# Patient Record
Sex: FEMALE | HISPANIC OR LATINO | Employment: FULL TIME | URBAN - METROPOLITAN AREA
[De-identification: names, ages, dates, MRNs, and addresses within clinical notes are randomized per-mention and may not be internally consistent; named-entity substitution may affect disease eponyms.]

---

## 2023-06-14 ENCOUNTER — LAB REQUISITION (OUTPATIENT)
Dept: LAB | Facility: CLINIC | Age: 18
End: 2023-06-14
Payer: COMMERCIAL

## 2023-06-14 ENCOUNTER — TRANSFERRED RECORDS (OUTPATIENT)
Dept: HEALTH INFORMATION MANAGEMENT | Facility: CLINIC | Age: 18
End: 2023-06-14
Payer: COMMERCIAL

## 2023-06-14 ENCOUNTER — MEDICAL CORRESPONDENCE (OUTPATIENT)
Dept: HEALTH INFORMATION MANAGEMENT | Facility: CLINIC | Age: 18
End: 2023-06-14
Payer: COMMERCIAL

## 2023-06-14 ENCOUNTER — TRANSCRIBE ORDERS (OUTPATIENT)
Dept: MATERNAL FETAL MEDICINE | Facility: CLINIC | Age: 18
End: 2023-06-14
Payer: COMMERCIAL

## 2023-06-14 DIAGNOSIS — Z34.82 ENCOUNTER FOR SUPERVISION OF OTHER NORMAL PREGNANCY, SECOND TRIMESTER: ICD-10-CM

## 2023-06-14 DIAGNOSIS — O26.90 PREGNANCY RELATED CONDITION, ANTEPARTUM: Primary | ICD-10-CM

## 2023-06-14 LAB
ABO/RH(D): NORMAL
ANTIBODY SCREEN: NEGATIVE
BASOPHILS # BLD AUTO: 0 10E3/UL (ref 0–0.2)
BASOPHILS NFR BLD AUTO: 0 %
EOSINOPHIL # BLD AUTO: 0.1 10E3/UL (ref 0–0.7)
EOSINOPHIL NFR BLD AUTO: 1 %
ERYTHROCYTE [DISTWIDTH] IN BLOOD BY AUTOMATED COUNT: 13.2 % (ref 10–15)
HBA1C MFR BLD: 5.1 %
HBV SURFACE AG SERPL QL IA: NONREACTIVE
HCT VFR BLD AUTO: 39.7 % (ref 35–47)
HCV AB SERPL QL IA: NONREACTIVE
HGB BLD-MCNC: 13.3 G/DL (ref 11.7–15.7)
HIV 1+2 AB+HIV1 P24 AG SERPL QL IA: NONREACTIVE
IMM GRANULOCYTES # BLD: 0 10E3/UL
IMM GRANULOCYTES NFR BLD: 0 %
LYMPHOCYTES # BLD AUTO: 2.5 10E3/UL (ref 0.8–5.3)
LYMPHOCYTES NFR BLD AUTO: 33 %
MCH RBC QN AUTO: 31.7 PG (ref 26.5–33)
MCHC RBC AUTO-ENTMCNC: 33.5 G/DL (ref 31.5–36.5)
MCV RBC AUTO: 95 FL (ref 78–100)
MONOCYTES # BLD AUTO: 0.7 10E3/UL (ref 0–1.3)
MONOCYTES NFR BLD AUTO: 9 %
NEUTROPHILS # BLD AUTO: 4.3 10E3/UL (ref 1.6–8.3)
NEUTROPHILS NFR BLD AUTO: 57 %
NRBC # BLD AUTO: 0 10E3/UL
NRBC BLD AUTO-RTO: 0 /100
PLATELET # BLD AUTO: 256 10E3/UL (ref 150–450)
RBC # BLD AUTO: 4.19 10E6/UL (ref 3.8–5.2)
SPECIMEN EXPIRATION DATE: NORMAL
WBC # BLD AUTO: 7.6 10E3/UL (ref 4–11)

## 2023-06-14 PROCEDURE — 87389 HIV-1 AG W/HIV-1&-2 AB AG IA: CPT | Mod: ORL

## 2023-06-14 PROCEDURE — 87340 HEPATITIS B SURFACE AG IA: CPT | Mod: ORL

## 2023-06-14 PROCEDURE — 86762 RUBELLA ANTIBODY: CPT | Mod: ORL

## 2023-06-14 PROCEDURE — 86592 SYPHILIS TEST NON-TREP QUAL: CPT | Mod: ORL

## 2023-06-14 PROCEDURE — 86850 RBC ANTIBODY SCREEN: CPT | Mod: ORL

## 2023-06-14 PROCEDURE — 87491 CHLMYD TRACH DNA AMP PROBE: CPT | Mod: ORL

## 2023-06-14 PROCEDURE — 83036 HEMOGLOBIN GLYCOSYLATED A1C: CPT | Mod: ORL

## 2023-06-14 PROCEDURE — 86803 HEPATITIS C AB TEST: CPT | Mod: ORL

## 2023-06-14 PROCEDURE — 86901 BLOOD TYPING SEROLOGIC RH(D): CPT | Mod: ORL

## 2023-06-14 PROCEDURE — 85025 COMPLETE CBC W/AUTO DIFF WBC: CPT | Mod: ORL

## 2023-06-14 PROCEDURE — 87086 URINE CULTURE/COLONY COUNT: CPT | Mod: ORL

## 2023-06-14 PROCEDURE — 87591 N.GONORRHOEAE DNA AMP PROB: CPT | Mod: ORL

## 2023-06-15 LAB
C TRACH DNA SPEC QL PROBE+SIG AMP: POSITIVE
N GONORRHOEA DNA SPEC QL NAA+PROBE: NEGATIVE
RPR SER QL: NONREACTIVE
RUBV IGG SERPL QL IA: 0.38 INDEX
RUBV IGG SERPL QL IA: NORMAL

## 2023-06-16 LAB — BACTERIA UR CULT: NORMAL

## 2023-07-03 ENCOUNTER — PRE VISIT (OUTPATIENT)
Dept: MATERNAL FETAL MEDICINE | Facility: CLINIC | Age: 18
End: 2023-07-03
Payer: COMMERCIAL

## 2023-07-11 ENCOUNTER — HOSPITAL ENCOUNTER (OUTPATIENT)
Dept: ULTRASOUND IMAGING | Facility: CLINIC | Age: 18
Discharge: HOME | End: 2023-07-11
Payer: COMMERCIAL

## 2023-07-11 ENCOUNTER — OFFICE VISIT (OUTPATIENT)
Dept: MATERNAL FETAL MEDICINE | Facility: CLINIC | Age: 18
End: 2023-07-11
Payer: COMMERCIAL

## 2023-07-11 DIAGNOSIS — O99.210 OBESITY IN PREGNANCY: Primary | ICD-10-CM

## 2023-07-11 DIAGNOSIS — O26.90 PREGNANCY RELATED CONDITION, ANTEPARTUM: ICD-10-CM

## 2023-07-11 DIAGNOSIS — Z36.2 ENCOUNTER FOR FOLLOW-UP ULTRASOUND OF FETAL ANATOMY: ICD-10-CM

## 2023-07-11 PROCEDURE — 76811 OB US DETAILED SNGL FETUS: CPT

## 2023-07-11 PROCEDURE — 76811 OB US DETAILED SNGL FETUS: CPT | Mod: 26 | Performed by: OBSTETRICS & GYNECOLOGY

## 2023-07-11 NOTE — PROGRESS NOTES
"Please see \"Imaging\" tab under \"Chart Review\" for details of today's US at the [facility].    Camden Cook MD  Maternal-Fetal Medicine    "

## 2023-08-02 ENCOUNTER — LAB (OUTPATIENT)
Dept: LAB | Facility: CLINIC | Age: 18
End: 2023-08-02
Attending: OBSTETRICS & GYNECOLOGY
Payer: COMMERCIAL

## 2023-08-02 ENCOUNTER — HOSPITAL ENCOUNTER (OUTPATIENT)
Dept: ULTRASOUND IMAGING | Facility: CLINIC | Age: 18
Discharge: HOME | End: 2023-08-02
Attending: OBSTETRICS & GYNECOLOGY
Payer: COMMERCIAL

## 2023-08-02 ENCOUNTER — OFFICE VISIT (OUTPATIENT)
Dept: MATERNAL FETAL MEDICINE | Facility: CLINIC | Age: 18
End: 2023-08-02
Attending: STUDENT IN AN ORGANIZED HEALTH CARE EDUCATION/TRAINING PROGRAM
Payer: COMMERCIAL

## 2023-08-02 ENCOUNTER — OFFICE VISIT (OUTPATIENT)
Dept: MATERNAL FETAL MEDICINE | Facility: CLINIC | Age: 18
End: 2023-08-02
Attending: OBSTETRICS & GYNECOLOGY
Payer: COMMERCIAL

## 2023-08-02 VITALS
SYSTOLIC BLOOD PRESSURE: 114 MMHG | OXYGEN SATURATION: 99 % | HEART RATE: 75 BPM | DIASTOLIC BLOOD PRESSURE: 73 MMHG | RESPIRATION RATE: 18 BRPM

## 2023-08-02 DIAGNOSIS — Q75.8 CONGENITAL ABSENCE OF NASAL BONE: ICD-10-CM

## 2023-08-02 DIAGNOSIS — Z31.430 ENCOUNTER OF FEMALE FOR TESTING FOR GENETIC DISEASE CARRIER STATUS FOR PROCREATIVE MANAGEMENT: ICD-10-CM

## 2023-08-02 DIAGNOSIS — Z36.9 ENCOUNTER FOR ANTENATAL SCREENING OF MOTHER: ICD-10-CM

## 2023-08-02 DIAGNOSIS — O28.3 ABNORMAL FETAL ULTRASOUND: ICD-10-CM

## 2023-08-02 DIAGNOSIS — Z31.5 ENCOUNTER FOR PROCREATIVE GENETIC COUNSELING AND TESTING: ICD-10-CM

## 2023-08-02 DIAGNOSIS — O99.210 OBESITY IN PREGNANCY: ICD-10-CM

## 2023-08-02 DIAGNOSIS — Z36.2 ENCOUNTER FOR FOLLOW-UP ULTRASOUND OF FETAL ANATOMY: ICD-10-CM

## 2023-08-02 DIAGNOSIS — O36.5990 FETAL GROWTH RESTRICTION ANTEPARTUM: ICD-10-CM

## 2023-08-02 DIAGNOSIS — O26.90 PREGNANCY RELATED CONDITION, ANTEPARTUM: Primary | ICD-10-CM

## 2023-08-02 DIAGNOSIS — O28.3 ABNORMAL FETAL ULTRASOUND: Primary | ICD-10-CM

## 2023-08-02 PROCEDURE — 76820 UMBILICAL ARTERY ECHO: CPT | Mod: 26 | Performed by: STUDENT IN AN ORGANIZED HEALTH CARE EDUCATION/TRAINING PROGRAM

## 2023-08-02 PROCEDURE — 36415 COLL VENOUS BLD VENIPUNCTURE: CPT

## 2023-08-02 PROCEDURE — 96040 HC GENETIC COUNSELING, EACH 30 MINUTES: CPT

## 2023-08-02 PROCEDURE — 76816 OB US FOLLOW-UP PER FETUS: CPT | Mod: 26 | Performed by: STUDENT IN AN ORGANIZED HEALTH CARE EDUCATION/TRAINING PROGRAM

## 2023-08-02 PROCEDURE — 99214 OFFICE O/P EST MOD 30 MIN: CPT | Mod: 25 | Performed by: STUDENT IN AN ORGANIZED HEALTH CARE EDUCATION/TRAINING PROGRAM

## 2023-08-02 PROCEDURE — 76820 UMBILICAL ARTERY ECHO: CPT

## 2023-08-02 NOTE — NURSING NOTE
Patient reports not yet feeling fetal movement, denies pain, denies contractions/pre-term labor, leaking of fluid, or bleeding. Patient denies headache, visual changes, nausea/vomiting, epigastric pain related to preeclampsia. Education provided to patient on RL2. SBAR given to BON SOLOMON, see their note in Epic. BP obtained d/t FGR.    Delia Niño RN

## 2023-08-02 NOTE — PROGRESS NOTES
Please see the full imaging report from the [employer] program under the imaging tab.    La Moore MD  Maternal Fetal Medicine

## 2023-08-03 NOTE — PROGRESS NOTES
[facility]  Genetic Counseling Consult    Patient:  Janet Rhodes YOB: 2005   Date of Service:  8/02/23   MRN: [id number]    Janet was seen at the [facility] for genetic consultation. The indication for genetic counseling is abnormal fetal ultrasound. The patient was accompanied to this visit by their grandmother and aunt.    IMPRESSION/ PLAN   1. Janet has not had genetic screening in this pregnancy but elected to have screening today.     2. During today's [facility] visit, Janet had a blood draw for Expanded NIPS through [facility]. The Expanded NIPS includes screening for trisomy 21, 18, and 13 and 22q11.2 deletion syndrome and the patient opted to screen for sex chromosome aneuploidies, including predicted fetal sex. In accordance with current guidelines, other microdeletion syndromes and rare autosomal trisomies were not included, but reflex to include these conditions remains available with Expanded NIPS if there is an indication later in the pregnancy. Results are expected in 7-14 days. The patient will be called with results and if they do not answer they requested a detailed message with results on their voicemail, including the predicted fetal sex information.  Patient was informed that results, including fetal sex, will be available in [employer]. Janet already knows fetal sex.    3. Janet elected to pursue expanded carrier screening for 558 conditions through [facility] laboratory. Results are expected within 2-3 weeks, and will be available in [employer].  We will contact Janet to discuss the results. She provided verbal permission for results to be left in her voicemail. Authorization to share protected health information was signed today's visit.     4. Janet had a level II comprehensive anatomy ultrasound today. Please see the ultrasound report for further details.    5. Janet is scheduled for follow-up  "ultrasound with [facility] for 2023.    PREGNANCY HISTORY   /Parity:       Janet's pregnancy history is insignificant. This is her first pregnancy.    CURRENT PREGNANCY   Current Age: 18 year old   Age at Delivery: 18 year old  JER: 2023, by Last Menstrual Period                                   Gestational Age: 21w5d  This pregnancy is a single gestation.   Absent nasal bone  We discused that markers identified on a level II ultrasound are findings that are used to adjust risk for chromosome abnormalities.  We discussed the specific finding that was identified today, absent nasal bone. This finding is not expected to cause any complications or concerns on its own, but we discussed that when it is identified, it may increase the risk for a pregnancy to be affected with Down syndrome.  About 50-60% of fetuses with Down syndrome and 6-7% euploid fetuses will have a absent/hypoplastic nasal bone We discussed that the relative risk increase for a absent/hypoplastic nasal bone is 6.6.    Intrauterine Growth Restriction (IUGR)    Intrauterine fetal growth restriction (IUGR) is commonly defined as an estimated fetal weight below the tenth percentile for any gestational age. Pregnancies with IUGR typically have more significant growth restriction than pregnancies with \"small for gestational age\" which can sometimes be constitutionally small, for example, due to parental size and height. Establishing accurate dating is always important before classifying IUGR.    Classifications of IUGR    In 70-80% of IUGR the growth restriction is asymmetrical which means the abdominal circumference is more significantly decreased then the head and femurs. In 20-30% of IUGR the growth restriction is symmetrical and the head, abdomen, and femurs are proportionally decreased. Fetuses with asymmetrical IUGR have been noted to have a higher risk for major anomalies, low birth weight,  mortality, hypertensive " disorders of pregnancy,  delivery, and overall poor outcomes.     Causes of IUGR    Maternal factors: Age, race, socioeconomic status, maternal weight at birth, low pre-pregnancy weight, chronic maternal hypoxia, exposures (smoking), small or large interpregnancy interval, assisted reproductive technologies, maternal diease (ex preeclampsia, chronic hypertension, lupus), malnutrition and gastrointestinal conditions (ex ulcerative colitis), and hematologic disorders    Fetal factors:   5-20% of IUGR cases will be due to a fetal genetic factor  Aneuploidy (ex trisomy 18)  Confined placental mosaicism (CPM) for trisomy 16 (CPM is three times more common in IUGR pregnancies)  Microdeletion syndrome (ex Coffey-Hirschhorn and Cri du chat syndrome)  Ring chromosomes  Uniparental disomy of chromosome 6, 14, 16  Inborn errors of metabolism  Single gene disorders (ex Claudette de Schilling syndrome)  10% of IUGR will also have a congenital anomaly   Single umbilical artery  Anencephaly  Omphalocele/ gastroschisis   Congenital diaphragmatic hernia  Renal agenesis/dysplasoia  Cardiac malformations   In utero infections accounts for less than 5-10% of IUGR cases  Viral infections (ex cytomegalovirus (CMV), herpes)  Parasitic infections (ex toxoplasmosis  Multiple gestation pregnancies like twins and triplets have a higher chance of IUGR     Placental Factors: Insufficiency, low placental weight, placenta acreeta, placental abruption, confined placental mosaicism, partial molar pregnancy, single umbilical artery, velamentous cord insertion, abnormal first trimester screening biochemical markers     Evaluation and Recommendations     Additional ultrasounds and monitoring throughout the pregnancy are common when IUGR is detected. Commonly growth ultrasounds are performed every few weeks as well as studies of the blood flow through the umbilical artery.     Genetic amniocentesis is recommended to consider. Typically a chromosome  "microarray analysis is ordered to look for common genetic causes like aneuploidy and microdeletion syndromes. If other ultrasound findings or family history suggest the likelihood of other genetic conditions then a panel or single gene test may be recommended. Infection studies, such as CMV, is commonly ordered on the amniotic fluid as well. Depending on the gestational age, maternal serology may also be collected to aid in the interpretation of the amniotic fluid infection results.     Non-invasive methods are also available and would likely include cell-free DNA aneuploidy screening for common trisomies and maternal serology studies.     Evaluation and referral to a pediatric geneticist would be recommended if there are any other concerns such as abnormalities, dysmorphic features, developmental delays, or growth problems after delivery.     Janet elected to proceed with NIPT and carrier screening.  MEDICAL HISTORY   Janet s reported medical history is not expected to impact pregnancy management or risks to fetal development.       FAMILY HISTORY   A three-generation pedigree was obtained today and is scanned under the \"Media\" tab in Epic. The family history was reported by Janet and their grandmother and aunt.    The following significant findings were reported today:   Janet's brother (14y) has syndactyly. Syndactyly is a condition where children are born with fused or webbed fingers/toes. It affects 1 in 2,500 babies. Sometimes it can be associated with a syndrome. Otherwise, when isolated it is typically multifactorial, or due to multiple factors. In Janet's brother's case it appears to be isolated.  Janet's father was killed at 28-years-old.  Janet's partner (25y) is healthy. Her partner's mother  in a car accident.    Otherwise, the reported family history is unremarkable for multiple miscarriages, stillbirths, birth defects, intellectual disabilities, known genetic conditions, cancer <50y, " and consanguinity.       CARRIER SCREENING   Expanded carrier screening is available to screen for autosomal recessive conditions and X-linked conditions in a large list of genes. Autosomal recessive conditions happen when a mutation has been inherited from the egg and sperm and include conditions like cystic fibrosis, thalassemia, hearing loss, spinal muscular atrophy, and more. X-linked conditions happen when a mutation has been inherited from the egg and include conditions like fragile X syndrome.  screening was also reviewed. About [place] Scre[facility]    As part of our conversation on carrier screening and how common or rare these condition are, we discussed the patient is of [place] ancestry and the father of the baby is of  ancestry.    The patient elected to pursue carrier screening today. We discussed the following:   Carrier screening does not test the pregnancy but gives a risk assessment for the pregnancy and future pregnancies to have the condition  There are different size panels or list of conditions for carrier screening. The patient elected for [facility]'s comprehensive panel of 558 genes including fragile X syndrome  Some conditions cause health problems for carriers  Carrier screening does not test for all genetic and health conditions or risk factors  There are limitations to current technology and results may be updated at a later date  The results typically take 2-3 weeks. They will be available in EPIC and routed to the referring OB provider. The patient can view them in [employer] and the lab's patient portal.  The patient's partner will be recommended to have carrier screening if the patient is found to be a carrier for an autosomal recessive condition. A consent to communicate was signed.  If an individual is a carrier, family members could be as well. The patient is encouraged to share this information with relatives.  The lab will communicate the out-of-pocket cost with  the patient and will also provide an option to switch to self-pay. The default is billing through insurance, and it is the patient's responsibility to respond to the communication if they would like to switch to self-pay.       RISK ASSESSMENT FOR CHROMOSOME CONDITIONS   We explained that the risk for fetal chromosome abnormalities increases with maternal age. We discussed specific features of common chromosome abnormalities, including Down syndrome, trisomy 13, trisomy 18, and sex chromosome trisomies.    At age 18 at midtrimester, the risk to have a baby with Down syndrome is 1 in 1176.  At age 18 at midtrimester, the risk to have a baby with any chromosome abnormality is 1 in 588.     Janet has not had genetic screening in this pregnancy but elected to have screening today.      GENETIC TESTING OPTIONS   Genetic testing during a pregnancy includes screening and diagnostic procedures.      Screening tests are non-invasive which means no risk to the pregnancy and includes ultrasounds and blood work. The benefits and limitations of screening were reviewed. Screening tests provide a risk assessment (chance) specific to the pregnancy for certain fetal chromosome abnormalities but cannot definitively diagnose or exclude a fetal chromosome abnormality. Follow-up genetic counseling and consideration of diagnostic testing is recommended with any abnormal screening result. Diagnostic testing during a pregnancy is more certain and can test for more conditions. However, the tests do have a risk of miscarriage that requires careful consideration. These tests can detect fetal chromosome abnormalities with greater than 99% certainty. Results can be compromised by maternal cell contamination or mosaicism and are limited by the resolution of current genetic testing technology.     There is no screening or diagnostic test that detects all forms of birth defects or intellectual disability.     We discussed the following screening  options:   Non-invasive prenatal testing (NIPT)  Also called cell-free DNA screening because it detects chromosomes from the placenta in the pregnant person's blood  Can be done any time after 10 weeks gestation  Screens for trisomy 21, trisomy 18, trisomy 13, and sex chromosome aneuploidies  Cannot screen for open neural tube defects, maternal serum AFP after 15 weeks is recommended  We discussed that NIPT can be used to screen for 22q11.2 deletion syndrome. The data on this condition is more limited, so there is not a positive or negative predictive value available for results interpretation. The patient elected screening for 22q11.2 deletion syndrome.    We discussed the following ultrasound options:  Comprehensive level II ultrasound (Fetal Anatomy Ultrasound)  Ultrasound done between 18-20 weeks gestation  Screens for major birth defects and markers for aneuploidy (like trisomy 21 and trisomy 18)  Includes looking at the fetus/baby's growth, heart, organs (stomach, kidneys), placenta, and amniotic fluid    We discussed the following diagnostic options:   Amniocentesis  Invasive diagnostic procedure done after 15 weeks gestation  The procedure collects a small sample of amniotic fluid for the purpose of chromosomal testing and/or other genetic testing  Diagnostic result; more than 99% sensitivity for fetal chromosome abnormalities  Testing for AFP in the amniotic fluid can test for open neural tube defects      It was a pleasure to be involved with Janet s care. Face-to-face time of the meeting was 30 minutes.    Mirella Myers, DEON, MS, [facility]  Certified and [place] Licensed Genetic Counselor  [facility]  Maternal Fetal Medicine  Office: [phone]  [facility]: [phone]   Fax: [phone]  [facility]

## 2023-08-04 ENCOUNTER — LAB REQUISITION (OUTPATIENT)
Dept: LAB | Facility: CLINIC | Age: 18
End: 2023-08-04

## 2023-08-04 DIAGNOSIS — A56.8 SEXUALLY TRANSMITTED CHLAMYDIAL INFECTION OF OTHER SITES: ICD-10-CM

## 2023-08-04 PROCEDURE — 87491 CHLMYD TRACH DNA AMP PROBE: CPT | Performed by: ADVANCED PRACTICE MIDWIFE

## 2023-08-04 PROCEDURE — 87591 N.GONORRHOEAE DNA AMP PROB: CPT | Performed by: ADVANCED PRACTICE MIDWIFE

## 2023-08-05 LAB
C TRACH DNA SPEC QL PROBE+SIG AMP: NEGATIVE
N GONORRHOEA DNA SPEC QL NAA+PROBE: NEGATIVE

## 2023-08-07 ENCOUNTER — PATIENT OUTREACH (OUTPATIENT)
Dept: CARE COORDINATION | Facility: CLINIC | Age: 18
End: 2023-08-07
Payer: COMMERCIAL

## 2023-08-07 ENCOUNTER — TELEPHONE (OUTPATIENT)
Dept: MATERNAL FETAL MEDICINE | Facility: CLINIC | Age: 18
End: 2023-08-07
Payer: COMMERCIAL

## 2023-08-07 LAB — SCANNED LAB RESULT: NORMAL

## 2023-08-07 ASSESSMENT — ACTIVITIES OF DAILY LIVING (ADL): DEPENDENT_IADLS:: INDEPENDENT

## 2023-08-07 NOTE — TELEPHONE ENCOUNTER
August 7, 2023    I spoke with Janet regarding her NIPT results.     Results indicate NO ANEUPLOIDY DETECTED for chromosomes 21, 18, 13, or the sex chromosomes (XX). Results were also negative for 22q11.2 deletion syndrome. I disclosed predicted fetal sex, per the patient request.     This puts her current pregnancy at low risk for Down syndrome, trisomy 18, trisomy 13 and sex chromosome abnormalities. This test is reported to have the following sensitivities: Down syndrome- >99.9%, trisomy 18- >99.9%, and trisomy 13- >99.9%. Although these results are reassuring, this does not replace a standard chromosome analysis from a chorionic villus sampling or amniocentesis.     Carrier screening is pending.    MSAFP is the appropriate second trimester screening test for open neural tube defects; the maternal quad screen is not recommended.    Her results are available in her Epic chart for her primary OB to review.    Mirella Myers MS, [facility]  Licensed Genetic Counselor  [facility]  Pager: [phone]  Office: [phone]

## 2023-08-09 ENCOUNTER — HOSPITAL ENCOUNTER (OUTPATIENT)
Facility: CLINIC | Age: 18
End: 2023-08-09
Admitting: OBSTETRICS & GYNECOLOGY
Payer: COMMERCIAL

## 2023-08-09 ENCOUNTER — HOSPITAL ENCOUNTER (OUTPATIENT)
Facility: CLINIC | Age: 18
Discharge: HOME | End: 2023-08-09
Attending: OBSTETRICS & GYNECOLOGY | Admitting: OBSTETRICS & GYNECOLOGY
Payer: COMMERCIAL

## 2023-08-09 VITALS — SYSTOLIC BLOOD PRESSURE: 132 MMHG | OXYGEN SATURATION: 98 % | DIASTOLIC BLOOD PRESSURE: 77 MMHG

## 2023-08-09 PROBLEM — Z36.89 ENCOUNTER FOR TRIAGE IN PREGNANT PATIENT: Status: ACTIVE | Noted: 2023-08-09

## 2023-08-09 LAB
ALBUMIN UR-MCNC: 10 MG/DL
APPEARANCE UR: ABNORMAL
BACTERIA #/AREA URNS HPF: ABNORMAL /HPF
BILIRUB UR QL STRIP: NEGATIVE
COLOR UR AUTO: YELLOW
GLUCOSE UR STRIP-MCNC: NEGATIVE MG/DL
HGB UR QL STRIP: ABNORMAL
HYALINE CASTS: 2 /LPF
KETONES UR STRIP-MCNC: 80 MG/DL
LEUKOCYTE ESTERASE UR QL STRIP: ABNORMAL
MUCOUS THREADS #/AREA URNS LPF: PRESENT /LPF
NITRATE UR QL: NEGATIVE
PH UR STRIP: 5.5 [PH] (ref 5–7)
RBC URINE: 8 /HPF
SP GR UR STRIP: 1.03 (ref 1–1.03)
SQUAMOUS EPITHELIAL: 9 /HPF
UROBILINOGEN UR STRIP-MCNC: NORMAL MG/DL
WBC URINE: 12 /HPF

## 2023-08-09 PROCEDURE — 250N000013 HC RX MED GY IP 250 OP 250 PS 637: Performed by: OBSTETRICS & GYNECOLOGY

## 2023-08-09 PROCEDURE — 87086 URINE CULTURE/COLONY COUNT: CPT | Performed by: OBSTETRICS & GYNECOLOGY

## 2023-08-09 PROCEDURE — 96374 THER/PROPH/DIAG INJ IV PUSH: CPT

## 2023-08-09 PROCEDURE — G0463 HOSPITAL OUTPT CLINIC VISIT: HCPCS | Mod: 25

## 2023-08-09 PROCEDURE — 258N000003 HC RX IP 258 OP 636: Performed by: OBSTETRICS & GYNECOLOGY

## 2023-08-09 PROCEDURE — 81001 URINALYSIS AUTO W/SCOPE: CPT | Performed by: OBSTETRICS & GYNECOLOGY

## 2023-08-09 PROCEDURE — 250N000011 HC RX IP 250 OP 636: Mod: JZ | Performed by: OBSTETRICS & GYNECOLOGY

## 2023-08-09 RX ORDER — ONDANSETRON 2 MG/ML
4 INJECTION INTRAMUSCULAR; INTRAVENOUS EVERY 6 HOURS PRN
Status: DISCONTINUED | OUTPATIENT
Start: 2023-08-09 | End: 2023-08-09 | Stop reason: HOSPADM

## 2023-08-09 RX ORDER — AZITHROMYCIN 250 MG/1
1000 TABLET, FILM COATED ORAL ONCE
Status: COMPLETED | OUTPATIENT
Start: 2023-08-09 | End: 2023-08-09

## 2023-08-09 RX ORDER — LIDOCAINE 40 MG/G
CREAM TOPICAL
Status: DISCONTINUED | OUTPATIENT
Start: 2023-08-09 | End: 2023-08-09 | Stop reason: HOSPADM

## 2023-08-09 RX ADMIN — AZITHROMYCIN MONOHYDRATE 1000 MG: 250 TABLET ORAL at 15:45

## 2023-08-09 RX ADMIN — ONDANSETRON 4 MG: 2 INJECTION INTRAMUSCULAR; INTRAVENOUS at 15:22

## 2023-08-09 RX ADMIN — SODIUM CHLORIDE, POTASSIUM CHLORIDE, SODIUM LACTATE AND CALCIUM CHLORIDE 1000 ML: 600; 310; 30; 20 INJECTION, SOLUTION INTRAVENOUS at 15:21

## 2023-08-09 ASSESSMENT — ACTIVITIES OF DAILY LIVING (ADL)
ADLS_ACUITY_SCORE: 35
ADLS_ACUITY_SCORE: 35

## 2023-08-09 NOTE — PROVIDER NOTIFICATION
08/09/23 1446   Provider Notification   Provider Name/Title Dr. Mcfarlane   Method of Notification Phone   Request Evaluate - Remote   Notification Reason Patient Arrived     Dr Stormy Mcfarlane informed of patient arrival and assessment including the following:    Reason for maternal/fetal assessment uterine contractions, vaginal bleeding. Pt report of symptoms detailed in previous note. Of note, patient states that she struggles to drink water and has only had a nesquik drink today. PO hydration offered. Fetal status appropriate for gestational age (gestation under 28 weeks).     Plan per provider/orders received to perform SVE, collect UA, offer 1L bolus of IV fluids, IV zofran and 1g of PO abx. Patient ok to be off toco monitoring. If UA WNL, patient ok to discharge home after treatment. RN will update MD with any concerns.

## 2023-08-09 NOTE — PLAN OF CARE
"Data: Patient presented to [facility]: 2023  1:34 PM.  Reason for maternal/fetal assessment is uterine contractions and vaginal bleeding. Patient reports intermittent cramping beginning at 1700 yesterday, and a small amount of light brown vaginal spotting while wiping this morning. She has not noticed clots, has had no need to wear a pad. She also reports burning when she voids, which \"a little earlier this week.\" Upon assessment, perineum appears WNL with small amount of white discharge. Uterus palpates soft, no ctx noted on toco.   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 Patient is a .  Prenatal record reviewed. Pregnancy  has been complicated by complex social situation,  , chlamydia infection (dx at first prenatal visit, inadequately treated per patient report.)   Gestational Age 22w4d. VSS. Fetal movement audible on doppler. Patient denies leaking of vaginal fluid/rupture of membranes, abdominal pain, pelvic pressure, nausea, headache, visual disturbances, epigastric or RUQ pain, significant edema. Support person is present.   Action: Verbal consent for EFM. Triage assessment completed. Bill of rights reviewed.  Response: Patient verbalized agreement with plan. Will contact Dr Stormy Mcfarlane with update and further orders.     "

## 2023-08-09 NOTE — DISCHARGE INSTRUCTIONS
Discharge Instruction for Undelivered Patients      You were seen for:  cramping and bleeding  We Consulted: Dr. Mcfarlane  You had (Test or Medicine):Fetal/uterine monitoring, UA analysis, IV hydration, zofran, and azithromycin     Diet:   Drink 8 to 12 glasses of liquids (milk, juice, water) every day.     Activity:  Call your doctor or nurse midwife if your baby is moving less than usual.     Call your provider if you notice:  Swelling in your face or increased swelling in your hands or legs.  Headaches that are not relieved by Tylenol (acetaminophen).  Changes in your vision (blurring: seeing spots or stars.)  Nausea (sick to your stomach) and vomiting (throwing up).   Weight gain of 5 pounds or more per week.  Heartburn that doesn't go away.  Signs of bladder infection: pain when you urinate (use the toilet), need to go more often and more urgently.  The bag of wray (rupture of membranes) breaks, or you notice leaking in your underwear.  Bright red blood in your underwear.  Abdominal (lower belly) or stomach pain.  For first baby: Contractions (tightening) less than 5 minutes apart for one hour or more.  *If less than 34 weeks: Contractions (tightening) more than 6 times in one hour.  Increase or change in vaginal discharge (note the color and amount)    Follow-up:  As scheduled in the clinic

## 2023-08-09 NOTE — PLAN OF CARE
Data: Patient assessed in the [facility] for cramping and bleeding.  Cervical exam closed and thick.  Membranes intact.   Action:  Presumed adequate fetal oxygenation documented (see flow record). Discharge instructions reviewed.  Patient instructed to report change in fetal movement, vaginal leaking of fluid or bleeding, abdominal pain, or any concerns related to the pregnancy to her nurse/physician.    Response: Orders to discharge home per Stormy Mcfarlane.  Patient verbalized understanding of education and verbalized agreement with plan. Discharged to home at 1715.

## 2023-08-09 NOTE — PROVIDER NOTIFICATION
08/09/23 1650   Provider Notification   Provider Name/Title Dr. Mcfarlane   Method of Notification Phone   Request Evaluate - Remote     Updated MD of pt's UA results (see in chart). MD would like to wait for culture results to come back before potentially prescribing pt medication. Pt finished bag of fluids, was given zofran, and zithromax. Pt able to keep food down and reports not feeling nauseous anymore, even had a snack. SVE: 0/0/-3. Orders received to discharge pt to home.

## 2023-08-11 LAB — BACTERIA UR CULT: NORMAL

## 2023-08-14 ENCOUNTER — TELEPHONE (OUTPATIENT)
Dept: MATERNAL FETAL MEDICINE | Facility: CLINIC | Age: 18
End: 2023-08-14
Payer: COMMERCIAL

## 2023-08-14 LAB — SCANNED LAB RESULT: ABNORMAL

## 2023-08-14 NOTE — TELEPHONE ENCOUNTER
Date: August 14, 2023     I called Janet to review the results of her carrier screening for the [employer] comprehensive carrier screening which assessed 558 genes. She did not answer, so I left a voicemail. 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 Otherwise, she is not expected to have symptoms of the conditions she carries. She is encouraged to share results with family members for their own consideration of carrier screening. 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 Carrier screening for the father of the baby is recommended for the best risk assessment.    GJB2-related conditions (GJB2 c.101T>C (p.Apv56Eez)):  GJB2-related conditions include autosomal recessive nonsyndromic deafness (DFNB1) and autosomal dominant nonsyndromic deafness (DFNA3) along with severe conditions involving deafness and skin findings. Severity of hearing loss can vary, even among affected individuals in the same family.  This particular variant is expected to be associated with DFNB1 and NOT DFNA3. It is also a low penetrance variant, meaning that not all individuals with this variant will have associated symptoms.   Some individuals with a GJB2 variant may also have nonsyndromic deafness due to a deletion on the other chromosome that includes an portion of DNA upstream of GJB2 that encompasses part of GJB6.   Carrier frequency for this condition is 1 in 50. Carrier screening for the father of the pregnancy will allow for a better risk assessment.    Glutaric acidemia type I (GCDH c.680G>C (p.Snq034Ahl)):  This is a condition in which the body cannot break down certain amino acids, causing these amino acids and their products to build up in the brain, causing damage.   Symptoms and severity can vary,  even among affected individuals in the same family. Many individuals present with symptoms in infancy or childhood, which include macrocephaly, brain dysfunction, loss of motor skills, low muscle tones, and seizures. These symptoms may be triggered by an infection or other stressors.   Other symptoms can include bleeding in the brain or eyes, developmental delay, and adult-onset kidney disease.   Initiation of early treatment may prevent or reduce the severity of the symptoms.   Carrier frequency for this condition is 1 in 50. Carrier screening for the father of the pregnancy will allow        for a better risk assessment.    Nonsyndromic deafness (TMPRSS3-related)) (TMPRSS3 c.326G>A (p.Kwp929Jfu)):  Nonsyndromic deafness is a condition that can be inherited in an autosomal recessive or autosomal dominant manner and can be caused by changes in multiple different genes.  The variant in this individual is expected to be associated with autosomal recessive nonsyndromic deafness.  Nonsyndromic deafness affects an individual's ability to hear. Individuals with autosomal recessive nonsyndromic deafness associated with a TMC1 variant usually have severe deafness at birth or in early childhood. Severity and age of onset is variable.  Intellect and lifespan are not affected. It does not affect other parts of the body.  ?1 in 500 are carriers for this condition. Therefore there is a ?1 in 2000 chance of the pregnancy being affected by this condition since The father fo the baby has not had carrier screening. Carrier screening for him would give the best risk assessment.     Primary Ciliary Dyskinesia (DNAH5-related) (DNAH5:  c.91053L>T (p.Bqp8118*)):  This condition involves the cilia that are microscopic finger-like projections that are involved in cell movement and signaling  This condition causes problems with respiration, hearing loss, ear infections, situs inversus totalis (mirror-image reversak of organs), infertility,  "and other health complications  Carrier frequency for this condition is 1 in 50. Carrier screening for the father of the pregnancy will allow  for a better risk assessment.    POL79V3-ntwjhwg conditions (OVM59A2 c.1020_1022del (p.Rmq243fpt))  Biallelic mutations in this gene cause several different conditions called sulfate transporter-related osteochondrodysplasias. These include atelosteogenesis type 2 (AO2), achondrogenesis type 1B (ACG1B), diastrophic dysplasia (DTD), and multiple epiphyseal dysplasia type 4 (EDM4).  Symptoms of AO2 and ACG1B are severe, including extremely short arms and legs, a narrow chest, and a rounded abdomen. These conditions are generally lethal or life-limiting and most affected individuals are stillborn.   Symptoms of DTD can be as severe as AO2 or ACG1B or can be more mild. A defining feature is \"hitchhiker thumbs\", referring to atypical positioning of the thumb. In alpesh severe cases, stillbirth or early death is common. However, there are individuals with DTD that can live into adulthood.   EDM4 is the most mild of these conditions. Symptoms can include joint pain, abnormalities of the hands, feet, knees, and spine. Height can be wtihin the normal range and affected individuals usually live into adulthood.   Carrier frequency for this condition is 1 in 158. Carrier screening for the father of the pregnancy will allow        for a better risk assessment.    PFJ53B-bfqhips conditions (WNT10A: c.682T>A (p.Ouh599Csm)):  These conditions  include autosomal recessive odonto-onycho-dermal dysplasia (OODD) and Vrqöqf-Oiggej-Slovncfw syndrome (SSPS) and autosomal dominant isolated tooth agenesis. People who are carriers of the autosomal recessive conditions may be at risk to develop the autosomal dominant condition.   OODD and SSPS refer to a spectrum of features associated with ectodermal dysplasia (ED), which causes abnormal development of the skin, hair, nails, teeth, and sweat glands. " Isolated tooth agenesis results in the absence of one or more teeth, typically secondary teeth. Some individuals with tooth agenesis can have mild symptoms of ED.  Carrier frequency for this condition is 1 in 305. Carrier screening for the father of the pregnancy will allow        for a better risk assessment.    Janet was asked to call me if she wished to review the results. Carrier screening for the father of the baby was recommended for the best risk assessment. Results are available in [facility] for review.    Mirella Myers MS, [facility]  Licensed Genetic Counselor  [facility]  Pager: [phone]  Office: [phone]

## 2023-08-18 ENCOUNTER — HOSPITAL ENCOUNTER (OUTPATIENT)
Dept: ULTRASOUND IMAGING | Facility: CLINIC | Age: 18
Discharge: HOME | End: 2023-08-18
Attending: STUDENT IN AN ORGANIZED HEALTH CARE EDUCATION/TRAINING PROGRAM
Payer: COMMERCIAL

## 2023-08-18 ENCOUNTER — OFFICE VISIT (OUTPATIENT)
Dept: MATERNAL FETAL MEDICINE | Facility: CLINIC | Age: 18
End: 2023-08-18
Attending: STUDENT IN AN ORGANIZED HEALTH CARE EDUCATION/TRAINING PROGRAM
Payer: COMMERCIAL

## 2023-08-18 DIAGNOSIS — O28.3 ABNORMAL FETAL ULTRASOUND: ICD-10-CM

## 2023-08-18 DIAGNOSIS — O36.5920 POOR FETAL GROWTH AFFECTING MANAGEMENT OF MOTHER IN SECOND TRIMESTER, SINGLE OR UNSPECIFIED FETUS: ICD-10-CM

## 2023-08-18 DIAGNOSIS — O26.90 PREGNANCY RELATED CONDITION, ANTEPARTUM: ICD-10-CM

## 2023-08-18 PROCEDURE — 76820 UMBILICAL ARTERY ECHO: CPT

## 2023-08-18 PROCEDURE — 59025 FETAL NON-STRESS TEST: CPT | Mod: 26 | Performed by: OBSTETRICS & GYNECOLOGY

## 2023-08-18 PROCEDURE — 59025 FETAL NON-STRESS TEST: CPT

## 2023-08-18 PROCEDURE — 76815 OB US LIMITED FETUS(S): CPT

## 2023-08-18 PROCEDURE — 76820 UMBILICAL ARTERY ECHO: CPT | Mod: 26 | Performed by: OBSTETRICS & GYNECOLOGY

## 2023-08-18 PROCEDURE — 76815 OB US LIMITED FETUS(S): CPT | Mod: 26 | Performed by: OBSTETRICS & GYNECOLOGY

## 2023-08-18 NOTE — NURSING NOTE
Patient has not felt fetal movement yet that she is aware of. Denies pain,  contractions, leaking of fluid. Did report an episode of small spotting that was after intercourse.  Patient denies headache, visual changes, nausea/vomiting, epigastric pain related to preeclampsia.  SBAR given to BON SOLOMON, see their note in Epic.    NST Performed due to FGR.  Dr. Russo reviewed efm tracing. See NST/BPP Doc Flowsheet tab.

## 2023-08-18 NOTE — PROGRESS NOTES
Please see full imaging report from [employer] program under imaging tab.    Michele Russo MD  Maternal Fetal Medicine

## 2023-08-25 ENCOUNTER — HOSPITAL ENCOUNTER (OUTPATIENT)
Dept: ULTRASOUND IMAGING | Facility: CLINIC | Age: 18
Discharge: HOME | End: 2023-08-25
Attending: STUDENT IN AN ORGANIZED HEALTH CARE EDUCATION/TRAINING PROGRAM
Payer: COMMERCIAL

## 2023-08-25 ENCOUNTER — OFFICE VISIT (OUTPATIENT)
Dept: MATERNAL FETAL MEDICINE | Facility: CLINIC | Age: 18
End: 2023-08-25
Attending: STUDENT IN AN ORGANIZED HEALTH CARE EDUCATION/TRAINING PROGRAM
Payer: COMMERCIAL

## 2023-08-25 DIAGNOSIS — O26.90 PREGNANCY RELATED CONDITION, ANTEPARTUM: ICD-10-CM

## 2023-08-25 DIAGNOSIS — O36.5990 FETAL GROWTH RESTRICTION ANTEPARTUM: Primary | ICD-10-CM

## 2023-08-25 PROCEDURE — 76816 OB US FOLLOW-UP PER FETUS: CPT | Mod: 26 | Performed by: OBSTETRICS & GYNECOLOGY

## 2023-08-25 PROCEDURE — 76820 UMBILICAL ARTERY ECHO: CPT

## 2023-08-25 PROCEDURE — 76820 UMBILICAL ARTERY ECHO: CPT | Mod: 26 | Performed by: OBSTETRICS & GYNECOLOGY

## 2023-08-25 PROCEDURE — 59025 FETAL NON-STRESS TEST: CPT | Mod: 26 | Performed by: OBSTETRICS & GYNECOLOGY

## 2023-08-25 PROCEDURE — 59025 FETAL NON-STRESS TEST: CPT

## 2023-08-25 NOTE — PROGRESS NOTES
"Please see \"Imaging\" tab under \"Chart Review\" for details of today's US at the [facility].    Camden Cook MD  Maternal-Fetal Medicine    "

## 2023-08-25 NOTE — NURSING NOTE
Patient reports positive fetal movement, denies contractions, leaking of fluid, or bleeding. NST Performed due to FGR.  Dr. Cook reviewed efm tracing. See NST/BPP Doc Flowsheet tab. SBAR given to BON SOLOMON, see their note in Epic.

## 2023-08-28 ENCOUNTER — PATIENT OUTREACH (OUTPATIENT)
Dept: CARE COORDINATION | Facility: CLINIC | Age: 18
End: 2023-08-28
Payer: COMMERCIAL

## 2023-09-01 ENCOUNTER — HOSPITAL ENCOUNTER (OUTPATIENT)
Dept: ULTRASOUND IMAGING | Facility: CLINIC | Age: 18
Discharge: HOME | End: 2023-09-01
Attending: OBSTETRICS & GYNECOLOGY
Payer: COMMERCIAL

## 2023-09-01 ENCOUNTER — OFFICE VISIT (OUTPATIENT)
Dept: MATERNAL FETAL MEDICINE | Facility: CLINIC | Age: 18
End: 2023-09-01
Attending: OBSTETRICS & GYNECOLOGY
Payer: COMMERCIAL

## 2023-09-01 DIAGNOSIS — O36.5990 FETAL GROWTH RESTRICTION ANTEPARTUM: ICD-10-CM

## 2023-09-01 PROCEDURE — 76820 UMBILICAL ARTERY ECHO: CPT

## 2023-09-01 PROCEDURE — 59025 FETAL NON-STRESS TEST: CPT

## 2023-09-01 PROCEDURE — 59025 FETAL NON-STRESS TEST: CPT | Mod: 26 | Performed by: OBSTETRICS & GYNECOLOGY

## 2023-09-01 PROCEDURE — 76815 OB US LIMITED FETUS(S): CPT | Mod: 26 | Performed by: OBSTETRICS & GYNECOLOGY

## 2023-09-01 PROCEDURE — 76820 UMBILICAL ARTERY ECHO: CPT | Mod: 26 | Performed by: OBSTETRICS & GYNECOLOGY

## 2023-09-01 NOTE — NURSING NOTE
Patient presents to [facility] for NST/limited/UAR at 25w6d due to FGR. Positive fetal movement. Denies LOF, vaginal bleeding or cramping/contractions. SBAR given to M MD, see their note in Epic.

## 2023-09-05 ENCOUNTER — HOSPITAL ENCOUNTER (OUTPATIENT)
Dept: ULTRASOUND IMAGING | Facility: CLINIC | Age: 18
Discharge: HOME | End: 2023-09-05
Attending: OBSTETRICS & GYNECOLOGY
Payer: COMMERCIAL

## 2023-09-05 ENCOUNTER — OFFICE VISIT (OUTPATIENT)
Dept: MATERNAL FETAL MEDICINE | Facility: CLINIC | Age: 18
End: 2023-09-05
Attending: OBSTETRICS & GYNECOLOGY
Payer: COMMERCIAL

## 2023-09-05 DIAGNOSIS — O36.5990 FETAL GROWTH RESTRICTION ANTEPARTUM: ICD-10-CM

## 2023-09-05 DIAGNOSIS — O36.5990 FETAL GROWTH RESTRICTION ANTEPARTUM: Primary | ICD-10-CM

## 2023-09-05 PROCEDURE — 76820 UMBILICAL ARTERY ECHO: CPT

## 2023-09-05 PROCEDURE — 76815 OB US LIMITED FETUS(S): CPT | Mod: 26 | Performed by: OBSTETRICS & GYNECOLOGY

## 2023-09-05 PROCEDURE — 59025 FETAL NON-STRESS TEST: CPT | Mod: 26 | Performed by: OBSTETRICS & GYNECOLOGY

## 2023-09-05 PROCEDURE — 76820 UMBILICAL ARTERY ECHO: CPT | Mod: 26 | Performed by: OBSTETRICS & GYNECOLOGY

## 2023-09-05 PROCEDURE — 59025 FETAL NON-STRESS TEST: CPT

## 2023-09-05 NOTE — NURSING NOTE
Patient reports good fetal movement, denies contractions, leaking of fluid, or bleeding.      NST Performed due to FGR.   reviewed efm tracing. See NST/BPP Doc Flowsheet tab.

## 2023-09-05 NOTE — PROGRESS NOTES
"Please see \"Imaging\" tab under \"Chart Review\" for details of today's US at the [facility].    Camden Cook MD  Maternal-Fetal Medicine    "

## 2023-09-12 ENCOUNTER — LAB REQUISITION (OUTPATIENT)
Dept: LAB | Facility: CLINIC | Age: 18
End: 2023-09-12

## 2023-09-12 ENCOUNTER — OFFICE VISIT (OUTPATIENT)
Dept: MATERNAL FETAL MEDICINE | Facility: CLINIC | Age: 18
End: 2023-09-12
Attending: OBSTETRICS & GYNECOLOGY
Payer: COMMERCIAL

## 2023-09-12 ENCOUNTER — HOSPITAL ENCOUNTER (OUTPATIENT)
Dept: ULTRASOUND IMAGING | Facility: CLINIC | Age: 18
Discharge: HOME | End: 2023-09-12
Attending: OBSTETRICS & GYNECOLOGY
Payer: COMMERCIAL

## 2023-09-12 DIAGNOSIS — Z36.89 ENCOUNTER FOR OTHER SPECIFIED ANTENATAL SCREENING: ICD-10-CM

## 2023-09-12 DIAGNOSIS — O36.5990 FETAL GROWTH RESTRICTION ANTEPARTUM: ICD-10-CM

## 2023-09-12 DIAGNOSIS — O36.5990 FETAL GROWTH RESTRICTION ANTEPARTUM: Primary | ICD-10-CM

## 2023-09-12 DIAGNOSIS — Z36.89 ENCOUNTER FOR ULTRASOUND TO ASSESS FETAL GROWTH: ICD-10-CM

## 2023-09-12 LAB
ERYTHROCYTE [DISTWIDTH] IN BLOOD BY AUTOMATED COUNT: 13 % (ref 10–15)
HCT VFR BLD AUTO: 35.2 % (ref 35–47)
HGB BLD-MCNC: 11.6 G/DL (ref 11.7–15.7)
MCH RBC QN AUTO: 31.8 PG (ref 26.5–33)
MCHC RBC AUTO-ENTMCNC: 33 G/DL (ref 31.5–36.5)
MCV RBC AUTO: 96 FL (ref 78–100)
PLATELET # BLD AUTO: 249 10E3/UL (ref 150–450)
RBC # BLD AUTO: 3.65 10E6/UL (ref 3.8–5.2)
WBC # BLD AUTO: 8.1 10E3/UL (ref 4–11)

## 2023-09-12 PROCEDURE — 86592 SYPHILIS TEST NON-TREP QUAL: CPT | Performed by: NURSE PRACTITIONER

## 2023-09-12 PROCEDURE — 76816 OB US FOLLOW-UP PER FETUS: CPT

## 2023-09-12 PROCEDURE — 76816 OB US FOLLOW-UP PER FETUS: CPT | Mod: 26 | Performed by: OBSTETRICS & GYNECOLOGY

## 2023-09-12 PROCEDURE — 85027 COMPLETE CBC AUTOMATED: CPT | Performed by: NURSE PRACTITIONER

## 2023-09-12 NOTE — NURSING NOTE
Patient presents to [facility] for RL2 at 27w3d due to FGR. Positive fetal movement. Denies LOF, vaginal bleeding or cramping/contractions. SBAR given to BON SOLOMON, see their note in Epic.

## 2023-09-12 NOTE — PROGRESS NOTES
"Please see \"Imaging\" tab under \"Chart Review\" for details of today's US at the [facility].    Camden Cook MD  Maternal-Fetal Medicine    "

## 2023-09-13 LAB — RPR SER QL: NONREACTIVE

## 2023-09-29 ENCOUNTER — PATIENT OUTREACH (OUTPATIENT)
Dept: CARE COORDINATION | Facility: CLINIC | Age: 18
End: 2023-09-29
Payer: COMMERCIAL

## 2023-10-03 ENCOUNTER — HOSPITAL ENCOUNTER (OUTPATIENT)
Dept: ULTRASOUND IMAGING | Facility: CLINIC | Age: 18
Discharge: HOME | End: 2023-10-03
Attending: OBSTETRICS & GYNECOLOGY
Payer: COMMERCIAL

## 2023-10-03 ENCOUNTER — OFFICE VISIT (OUTPATIENT)
Dept: MATERNAL FETAL MEDICINE | Facility: CLINIC | Age: 18
End: 2023-10-03
Attending: OBSTETRICS & GYNECOLOGY
Payer: COMMERCIAL

## 2023-10-03 DIAGNOSIS — Z36.89 ENCOUNTER FOR ULTRASOUND TO ASSESS FETAL GROWTH: ICD-10-CM

## 2023-10-03 DIAGNOSIS — Z36.89 ENCOUNTER FOR ULTRASOUND TO ASSESS FETAL GROWTH: Primary | ICD-10-CM

## 2023-10-03 PROCEDURE — 76816 OB US FOLLOW-UP PER FETUS: CPT

## 2023-10-03 PROCEDURE — 76816 OB US FOLLOW-UP PER FETUS: CPT | Mod: 26 | Performed by: OBSTETRICS & GYNECOLOGY

## 2023-10-03 NOTE — PROGRESS NOTES
"Please see \"Imaging\" tab under \"Chart Review\" for details of today's US at the [facility].    Camden Cook MD  Maternal-Fetal Medicine    "

## 2023-10-03 NOTE — NURSING NOTE
Patient reports  fetal movement, denies pain,  contractions, leaking of fluid, or bleeding. Patient denies headache, visual changes, nausea/vomiting, epigastric pain related to preeclampsia.  SBAR given to BON SOLOMON, see their note in Epic.

## 2023-10-22 ENCOUNTER — HEALTH MAINTENANCE LETTER (OUTPATIENT)
Age: 18
End: 2023-10-22

## 2023-10-24 ENCOUNTER — PATIENT OUTREACH (OUTPATIENT)
Dept: CARE COORDINATION | Facility: CLINIC | Age: 18
End: 2023-10-24
Payer: COMMERCIAL

## 2023-10-25 ENCOUNTER — HOSPITAL ENCOUNTER (OUTPATIENT)
Facility: CLINIC | Age: 18
Discharge: HOME | End: 2023-10-25
Attending: OBSTETRICS & GYNECOLOGY | Admitting: OBSTETRICS & GYNECOLOGY
Payer: COMMERCIAL

## 2023-10-25 ENCOUNTER — HOSPITAL ENCOUNTER (OUTPATIENT)
Facility: CLINIC | Age: 18
End: 2023-10-25
Admitting: OBSTETRICS & GYNECOLOGY
Payer: COMMERCIAL

## 2023-10-25 ENCOUNTER — APPOINTMENT (OUTPATIENT)
Dept: ULTRASOUND IMAGING | Facility: CLINIC | Age: 18
End: 2023-10-25
Attending: OBSTETRICS & GYNECOLOGY
Payer: COMMERCIAL

## 2023-10-25 VITALS
RESPIRATION RATE: 18 BRPM | WEIGHT: 243 LBS | BODY MASS INDEX: 43.05 KG/M2 | OXYGEN SATURATION: 98 % | TEMPERATURE: 98.6 F | DIASTOLIC BLOOD PRESSURE: 62 MMHG | SYSTOLIC BLOOD PRESSURE: 123 MMHG | HEIGHT: 63 IN | HEART RATE: 78 BPM

## 2023-10-25 LAB
ALBUMIN UR-MCNC: 20 MG/DL
APPEARANCE UR: ABNORMAL
BACTERIA #/AREA URNS HPF: ABNORMAL /HPF
BILIRUB UR QL STRIP: NEGATIVE
COLOR UR AUTO: YELLOW
GLUCOSE UR STRIP-MCNC: NEGATIVE MG/DL
HGB UR QL STRIP: NEGATIVE
KETONES UR STRIP-MCNC: NEGATIVE MG/DL
LEUKOCYTE ESTERASE UR QL STRIP: ABNORMAL
MUCOUS THREADS #/AREA URNS LPF: PRESENT /LPF
NITRATE UR QL: NEGATIVE
PH UR STRIP: 5.5 [PH] (ref 5–7)
RBC URINE: 3 /HPF
SP GR UR STRIP: 1.03 (ref 1–1.03)
SQUAMOUS EPITHELIAL: 12 /HPF
UROBILINOGEN UR STRIP-MCNC: NORMAL MG/DL
WBC URINE: 3 /HPF

## 2023-10-25 PROCEDURE — G0463 HOSPITAL OUTPT CLINIC VISIT: HCPCS | Mod: 25

## 2023-10-25 PROCEDURE — 76819 FETAL BIOPHYS PROFIL W/O NST: CPT

## 2023-10-25 PROCEDURE — 87086 URINE CULTURE/COLONY COUNT: CPT | Performed by: OBSTETRICS & GYNECOLOGY

## 2023-10-25 PROCEDURE — 81001 URINALYSIS AUTO W/SCOPE: CPT | Performed by: OBSTETRICS & GYNECOLOGY

## 2023-10-25 ASSESSMENT — ACTIVITIES OF DAILY LIVING (ADL)
ADLS_ACUITY_SCORE: 35
ADLS_ACUITY_SCORE: 35

## 2023-10-25 NOTE — PLAN OF CARE
Dr Stormy Mcfarlane informed of patient arrival and assessment including the following:    Reason for maternal/fetal assessment vaginal bleeding. Pt reports Reason for maternal/fetal assessment is dark red vaginal bleeding on toilet tissue with some cramping which has currently resolved. Patient reports had intercourse last evening and this morning noted dark red bleeding. Called the clinic and was advised to come to labor and delivery. In addition, patient reports that she had a similar episode earlier in the pregnancy.  Patient is a .  Prenatal record reviewed. Pregnancy  has been complicated by fetal growth restriction, chlamydia, received treatment,BMI 40.  Gestational Age 33w4d. VSS. Fetal movement active. Patient denies uterine contractions, leaking of vaginal fluid/rupture of membranes, abdominal pain, pelvic pressure, nausea, vomiting, headache, visual disturbances, epigastric or URQ pain. Support person is present.. Fetal status normal baseline, moderate variability, accelerations of 10 x 10 present and no decelerations. Plan per provider/orders received for continue EFM, encourage fluids, send UA/UC and call with results.

## 2023-10-25 NOTE — PLAN OF CARE
Data: [facility]: 10/25/2023 12:35 PM.  Reason for maternal/fetal assessment is dark red vaginal bleeding on toilet tissue with some cramping which has currently resolved. Patient reports had intercourse last evening and this morning noted dark red bleeding. Called the clinic and was advised to come to labor and delivery. In addition, patient reports that she had a similar episode earlier in the pregnancy.  Patient is a .  Prenatal record reviewed. Pregnancy  has been complicated by fetal growth restriction, chlamydia, received treatment,BMI 40.  Gestational Age 33w4d. VSS. Fetal movement active. Patient denies uterine contractions, leaking of vaginal fluid/rupture of membranes, abdominal pain, pelvic pressure, nausea, vomiting, headache, visual disturbances, epigastric or URQ pain. Support person is present.   Action: Verbal consent for EFM. Triage assessment completed. Bill of rights reviewed.  Response: Patient verbalized agreement with plan. Will contact Dr Stormy Mcfarlane with update and for further orders.

## 2023-10-25 NOTE — PLAN OF CARE
Dr Mcfarlane notified pt's BPP results 8/8. Plan to discharge to home. Provider will follow up when urine culture results are finalized. Pt confirms she has a clinic appointment scheduled next week. RN will prepare pt for discharge.

## 2023-10-25 NOTE — DISCHARGE INSTRUCTIONS
Discharge Instruction for Undelivered Patients      You were seen for: Bleeding Assessment  We Consulted: Dr Mcfarlane  You had (Test or Medicine):Biophysical Profile     Diet:   Drink 8 to 12 glasses of liquids (milk, juice, water) every day.  You may eat meals and snacks.     Activity:  Call your doctor or nurse midwife if your baby is moving less than usual.     Call your provider if you notice:  Swelling in your face or increased swelling in your hands or legs.  Headaches that are not relieved by Tylenol (acetaminophen).  Changes in your vision (blurring: seeing spots or stars.)  Nausea (sick to your stomach) and vomiting (throwing up).   Weight gain of 5 pounds or more per week.  Heartburn that doesn't go away.  Signs of bladder infection: pain when you urinate (use the toilet), need to go more often and more urgently.  The bag of wray (rupture of membranes) breaks, or you notice leaking in your underwear.  Bright red blood in your underwear.  Abdominal (lower belly) or stomach pain.  For first baby: Contractions (tightening) less than 5 minutes apart for one hour or more.    *If less than 34 weeks: Contractions (tightening) more than 6 times in one hour.  Increase or change in vaginal discharge (note the color and amount)      Follow-up:  As scheduled in the clinic. Your clinic will follow up on your urine culture results and determine treatment if needed.

## 2023-10-25 NOTE — PLAN OF CARE
Dr Mcfarlane notified of UA results, will reflex to culture. Continuing to have difficulty monitoring FHR. Orders received for BPP. Pt agrees with POC.

## 2023-10-26 LAB — BACTERIA UR CULT: NORMAL

## 2023-11-07 ENCOUNTER — PATIENT OUTREACH (OUTPATIENT)
Dept: CARE COORDINATION | Facility: CLINIC | Age: 18
End: 2023-11-07
Payer: COMMERCIAL

## 2023-11-14 ENCOUNTER — LAB REQUISITION (OUTPATIENT)
Dept: LAB | Facility: CLINIC | Age: 18
End: 2023-11-14
Payer: COMMERCIAL

## 2023-11-14 DIAGNOSIS — Z3A.36 36 WEEKS GESTATION OF PREGNANCY: ICD-10-CM

## 2023-11-14 PROCEDURE — 87653 STREP B DNA AMP PROBE: CPT | Mod: ORL | Performed by: MIDWIFE

## 2023-11-15 ENCOUNTER — PATIENT OUTREACH (OUTPATIENT)
Dept: CARE COORDINATION | Facility: CLINIC | Age: 18
End: 2023-11-15
Payer: COMMERCIAL

## 2023-11-16 LAB — GP B STREP DNA SPEC QL NAA+PROBE: POSITIVE

## 2023-11-25 ENCOUNTER — HOSPITAL ENCOUNTER (OUTPATIENT)
Facility: CLINIC | Age: 18
Discharge: HOME | End: 2023-11-25
Attending: MIDWIFE | Admitting: MIDWIFE
Payer: COMMERCIAL

## 2023-11-25 VITALS — TEMPERATURE: 98.1 F | DIASTOLIC BLOOD PRESSURE: 58 MMHG | SYSTOLIC BLOOD PRESSURE: 115 MMHG

## 2023-11-25 LAB
ALBUMIN UR-MCNC: 20 MG/DL
APPEARANCE UR: CLEAR
BILIRUB UR QL STRIP: NEGATIVE
CLUE CELLS: PRESENT
COLOR UR AUTO: ABNORMAL
GLUCOSE UR STRIP-MCNC: NEGATIVE MG/DL
HGB UR QL STRIP: NEGATIVE
KETONES UR STRIP-MCNC: NEGATIVE MG/DL
LEUKOCYTE ESTERASE UR QL STRIP: ABNORMAL
MUCOUS THREADS #/AREA URNS LPF: PRESENT /LPF
NITRATE UR QL: NEGATIVE
PH UR STRIP: 6 [PH] (ref 5–7)
RBC URINE: 2 /HPF
SP GR UR STRIP: 1.03 (ref 1–1.03)
SQUAMOUS EPITHELIAL: 3 /HPF
TRICHOMONAS, WET PREP: ABNORMAL
UROBILINOGEN UR STRIP-MCNC: NORMAL MG/DL
WBC URINE: 2 /HPF
WBC'S/HIGH POWER FIELD, WET PREP: ABNORMAL
YEAST, WET PREP: ABNORMAL

## 2023-11-25 PROCEDURE — 87210 SMEAR WET MOUNT SALINE/INK: CPT | Performed by: MIDWIFE

## 2023-11-25 PROCEDURE — 81001 URINALYSIS AUTO W/SCOPE: CPT | Performed by: MIDWIFE

## 2023-11-25 PROCEDURE — G0463 HOSPITAL OUTPT CLINIC VISIT: HCPCS

## 2023-11-25 RX ORDER — METOCLOPRAMIDE 10 MG/1
10 TABLET ORAL EVERY 6 HOURS PRN
Status: DISCONTINUED | OUTPATIENT
Start: 2023-11-25 | End: 2023-11-25 | Stop reason: HOSPADM

## 2023-11-25 RX ORDER — ONDANSETRON 2 MG/ML
4 INJECTION INTRAMUSCULAR; INTRAVENOUS EVERY 6 HOURS PRN
Status: DISCONTINUED | OUTPATIENT
Start: 2023-11-25 | End: 2023-11-25 | Stop reason: HOSPADM

## 2023-11-25 RX ORDER — METOCLOPRAMIDE HYDROCHLORIDE 5 MG/ML
10 INJECTION INTRAMUSCULAR; INTRAVENOUS EVERY 6 HOURS PRN
Status: DISCONTINUED | OUTPATIENT
Start: 2023-11-25 | End: 2023-11-25 | Stop reason: HOSPADM

## 2023-11-25 RX ORDER — PROCHLORPERAZINE MALEATE 5 MG
10 TABLET ORAL EVERY 6 HOURS PRN
Status: DISCONTINUED | OUTPATIENT
Start: 2023-11-25 | End: 2023-11-25 | Stop reason: HOSPADM

## 2023-11-25 RX ORDER — ONDANSETRON 4 MG/1
4 TABLET, ORALLY DISINTEGRATING ORAL EVERY 6 HOURS PRN
Status: DISCONTINUED | OUTPATIENT
Start: 2023-11-25 | End: 2023-11-25 | Stop reason: HOSPADM

## 2023-11-25 RX ORDER — PROCHLORPERAZINE 25 MG
25 SUPPOSITORY, RECTAL RECTAL EVERY 12 HOURS PRN
Status: DISCONTINUED | OUTPATIENT
Start: 2023-11-25 | End: 2023-11-25 | Stop reason: HOSPADM

## 2023-11-25 ASSESSMENT — ACTIVITIES OF DAILY LIVING (ADL): ADLS_ACUITY_SCORE: 35

## 2023-11-25 NOTE — PLAN OF CARE
"[place] [facility] [place] accompanied by her SO, Monico, for evaluation of abdominal cramping and decreased fetal movement. Pt to MAC 3, oriented to room and call light, monitors applied with verbal consent. RN had difficulty finding heart tones do to anterior placenta and maternal habitus. (BMI of 40+) Fht's located.     Prenatal record reviewed, admission database completed and physical assessment obtained.     Pt is a  38w0d of [facility] ob/gyn midwives who state that she felt an onset of cramping around 1100 today and that she \"wasn't feeling baby move like normal\". She denies LOF, vaginal bleeding or unusual discharge. She reports some burning with urination. She also reports occasional marijuana use in pregnancy as noted in her prenatal record.     NST obtained. Fht's 145 with moderate variability, accles present and no decels. No UC's on monitor, reported by the patient or palpated by this RN.    SVE performed with pts consent, closed/40%/-3 no fluid or bleeding visualized. The introitus does appear reddened and there is thick white discharge present. Wet prep collected. UA also collected and labs sent.     Kinga Rhoades CNM updated regarding the above data. Discharge order received. Provider will send rx for Flagyl to the pts pharmacy.     Discharge instructions reviewed with Janet and questions answered. This RN emphasized the importance of picking up her prescription tonight, taking the medication as prescribed and completing the course to avoid further complications. Pt verbalizes understanding and agreement.    Pt d/c'd home ambulatory.     "

## 2023-11-25 NOTE — DISCHARGE INSTRUCTIONS
Discharge Instruction for Undelivered Patients      You were seen for: Labor Assessment and Fetal Assessment  We Consulted: Kinga Caraballo CNM   You had (Test or Medicine):fetal and uterine monitoring     Diet:   Drink 8 to 12 glasses of liquids (milk, juice, water) every day.  You may eat meals and snacks.     Activity:  Count fetal kicks everyday (see handout)  Call your doctor or nurse midwife if your baby is moving less than usual.     Call your provider if you notice:  Swelling in your face or increased swelling in your hands or legs.  Headaches that are not relieved by Tylenol (acetaminophen).  Changes in your vision (blurring: seeing spots or stars.)  Nausea (sick to your stomach) and vomiting (throwing up).   Weight gain of 5 pounds or more per week.  Heartburn that doesn't go away.  Signs of bladder infection: pain when you urinate (use the toilet), need to go more often and more urgently.  The bag of wray (rupture of membranes) breaks, or you notice leaking in your underwear.  Bright red blood in your underwear.  Abdominal (lower belly) or stomach pain.  For first baby: Contractions (tightening) less than 5 minutes apart for one hour or more.  Increase or change in vaginal discharge (note the color and amount)    Follow-up:  As scheduled in the clinic    ** prescription for Flagyl at your pharmacy and take as directed.

## 2023-11-28 ENCOUNTER — LAB REQUISITION (OUTPATIENT)
Dept: LAB | Facility: CLINIC | Age: 18
End: 2023-11-28

## 2023-11-28 DIAGNOSIS — Z86.19 PERSONAL HISTORY OF OTHER INFECTIOUS AND PARASITIC DISEASES: ICD-10-CM

## 2023-11-28 PROCEDURE — 87591 N.GONORRHOEAE DNA AMP PROB: CPT | Performed by: ADVANCED PRACTICE MIDWIFE

## 2023-11-28 PROCEDURE — 87491 CHLMYD TRACH DNA AMP PROBE: CPT | Performed by: ADVANCED PRACTICE MIDWIFE

## 2023-11-29 LAB
C TRACH DNA SPEC QL PROBE+SIG AMP: NEGATIVE
N GONORRHOEA DNA SPEC QL NAA+PROBE: NEGATIVE

## 2023-12-01 ENCOUNTER — HOSPITAL ENCOUNTER (INPATIENT)
Facility: CLINIC | Age: 18
LOS: 3 days | Discharge: HOME HEALTH | End: 2023-12-05
Attending: OBSTETRICS & GYNECOLOGY
Payer: COMMERCIAL

## 2023-12-01 DIAGNOSIS — O14.13 SEVERE PRE-ECLAMPSIA IN THIRD TRIMESTER: ICD-10-CM

## 2023-12-01 LAB
ABO/RH(D): NORMAL
ALBUMIN MFR UR ELPH: 35.8 MG/DL
ALT SERPL W P-5'-P-CCNC: 15 U/L (ref 0–50)
ANTIBODY SCREEN: NEGATIVE
AST SERPL W P-5'-P-CCNC: 17 U/L (ref 0–45)
CREAT SERPL-MCNC: 0.53 MG/DL (ref 0.51–0.95)
CREAT UR-MCNC: 95.3 MG/DL
EGFRCR SERPLBLD CKD-EPI 2021: >90 ML/MIN/1.73M2
ERYTHROCYTE [DISTWIDTH] IN BLOOD BY AUTOMATED COUNT: 13.9 % (ref 10–15)
HCT VFR BLD AUTO: 36 % (ref 35–47)
HGB BLD-MCNC: 12 G/DL (ref 11.7–15.7)
HOLD SPECIMEN: NORMAL
MCH RBC QN AUTO: 30.5 PG (ref 26.5–33)
MCHC RBC AUTO-ENTMCNC: 33.3 G/DL (ref 31.5–36.5)
MCV RBC AUTO: 91 FL (ref 78–100)
PLATELET # BLD AUTO: 242 10E3/UL (ref 150–450)
PROT/CREAT 24H UR: 0.38 MG/MG CR (ref 0–0.2)
RBC # BLD AUTO: 3.94 10E6/UL (ref 3.8–5.2)
SPECIMEN EXPIRATION DATE: NORMAL
WBC # BLD AUTO: 10.8 10E3/UL (ref 4–11)

## 2023-12-01 PROCEDURE — 86780 TREPONEMA PALLIDUM: CPT

## 2023-12-01 PROCEDURE — 85027 COMPLETE CBC AUTOMATED: CPT

## 2023-12-01 PROCEDURE — 82565 ASSAY OF CREATININE: CPT

## 2023-12-01 PROCEDURE — 258N000003 HC RX IP 258 OP 636

## 2023-12-01 PROCEDURE — 86850 RBC ANTIBODY SCREEN: CPT

## 2023-12-01 PROCEDURE — 84460 ALANINE AMINO (ALT) (SGPT): CPT

## 2023-12-01 PROCEDURE — 84450 TRANSFERASE (AST) (SGOT): CPT

## 2023-12-01 PROCEDURE — 250N000009 HC RX 250

## 2023-12-01 PROCEDURE — 250N000013 HC RX MED GY IP 250 OP 250 PS 637

## 2023-12-01 PROCEDURE — 86901 BLOOD TYPING SEROLOGIC RH(D): CPT

## 2023-12-01 PROCEDURE — 84156 ASSAY OF PROTEIN URINE: CPT

## 2023-12-01 PROCEDURE — 250N000011 HC RX IP 250 OP 636

## 2023-12-01 PROCEDURE — 36415 COLL VENOUS BLD VENIPUNCTURE: CPT

## 2023-12-01 RX ORDER — NALOXONE HYDROCHLORIDE 0.4 MG/ML
0.2 INJECTION, SOLUTION INTRAMUSCULAR; INTRAVENOUS; SUBCUTANEOUS
Status: DISCONTINUED | OUTPATIENT
Start: 2023-12-01 | End: 2023-12-02 | Stop reason: HOSPADM

## 2023-12-01 RX ORDER — PROCHLORPERAZINE MALEATE 5 MG
10 TABLET ORAL EVERY 6 HOURS PRN
Status: DISCONTINUED | OUTPATIENT
Start: 2023-12-01 | End: 2023-12-02 | Stop reason: HOSPADM

## 2023-12-01 RX ORDER — ACETAMINOPHEN 325 MG/1
650 TABLET ORAL EVERY 4 HOURS PRN
Status: DISCONTINUED | OUTPATIENT
Start: 2023-12-01 | End: 2023-12-02 | Stop reason: HOSPADM

## 2023-12-01 RX ORDER — NALOXONE HYDROCHLORIDE 0.4 MG/ML
0.4 INJECTION, SOLUTION INTRAMUSCULAR; INTRAVENOUS; SUBCUTANEOUS
Status: DISCONTINUED | OUTPATIENT
Start: 2023-12-01 | End: 2023-12-02 | Stop reason: HOSPADM

## 2023-12-01 RX ORDER — PENICILLIN G 3000000 [IU]/50ML
3 INJECTION, SOLUTION INTRAVENOUS EVERY 4 HOURS
Status: DISCONTINUED | OUTPATIENT
Start: 2023-12-01 | End: 2023-12-02 | Stop reason: HOSPADM

## 2023-12-01 RX ORDER — METOCLOPRAMIDE 10 MG/1
10 TABLET ORAL EVERY 6 HOURS PRN
Status: DISCONTINUED | OUTPATIENT
Start: 2023-12-01 | End: 2023-12-02 | Stop reason: HOSPADM

## 2023-12-01 RX ORDER — MISOPROSTOL 200 UG/1
400 TABLET ORAL
Status: DISCONTINUED | OUTPATIENT
Start: 2023-12-01 | End: 2023-12-02 | Stop reason: HOSPADM

## 2023-12-01 RX ORDER — METOCLOPRAMIDE HYDROCHLORIDE 5 MG/ML
10 INJECTION INTRAMUSCULAR; INTRAVENOUS EVERY 6 HOURS PRN
Status: DISCONTINUED | OUTPATIENT
Start: 2023-12-01 | End: 2023-12-02 | Stop reason: HOSPADM

## 2023-12-01 RX ORDER — OXYTOCIN/0.9 % SODIUM CHLORIDE 30/500 ML
1-24 PLASTIC BAG, INJECTION (ML) INTRAVENOUS CONTINUOUS
Status: DISCONTINUED | OUTPATIENT
Start: 2023-12-01 | End: 2023-12-02 | Stop reason: HOSPADM

## 2023-12-01 RX ORDER — IBUPROFEN 400 MG/1
800 TABLET, FILM COATED ORAL
Status: DISCONTINUED | OUTPATIENT
Start: 2023-12-01 | End: 2023-12-03

## 2023-12-01 RX ORDER — PROCHLORPERAZINE 25 MG
25 SUPPOSITORY, RECTAL RECTAL EVERY 12 HOURS PRN
Status: DISCONTINUED | OUTPATIENT
Start: 2023-12-01 | End: 2023-12-02 | Stop reason: HOSPADM

## 2023-12-01 RX ORDER — CITRIC ACID/SODIUM CITRATE 334-500MG
30 SOLUTION, ORAL ORAL
Status: DISCONTINUED | OUTPATIENT
Start: 2023-12-01 | End: 2023-12-02 | Stop reason: HOSPADM

## 2023-12-01 RX ORDER — OXYTOCIN 10 [USP'U]/ML
10 INJECTION, SOLUTION INTRAMUSCULAR; INTRAVENOUS
Status: DISCONTINUED | OUTPATIENT
Start: 2023-12-01 | End: 2023-12-02

## 2023-12-01 RX ORDER — METHYLERGONOVINE MALEATE 0.2 MG/ML
200 INJECTION INTRAVENOUS
Status: DISCONTINUED | OUTPATIENT
Start: 2023-12-01 | End: 2023-12-02 | Stop reason: HOSPADM

## 2023-12-01 RX ORDER — OXYTOCIN/0.9 % SODIUM CHLORIDE 30/500 ML
340 PLASTIC BAG, INJECTION (ML) INTRAVENOUS CONTINUOUS PRN
Status: DISCONTINUED | OUTPATIENT
Start: 2023-12-01 | End: 2023-12-02 | Stop reason: HOSPADM

## 2023-12-01 RX ORDER — ONDANSETRON 4 MG/1
4 TABLET, ORALLY DISINTEGRATING ORAL EVERY 6 HOURS PRN
Status: DISCONTINUED | OUTPATIENT
Start: 2023-12-01 | End: 2023-12-02 | Stop reason: HOSPADM

## 2023-12-01 RX ORDER — ONDANSETRON 2 MG/ML
4 INJECTION INTRAMUSCULAR; INTRAVENOUS EVERY 6 HOURS PRN
Status: DISCONTINUED | OUTPATIENT
Start: 2023-12-01 | End: 2023-12-02 | Stop reason: HOSPADM

## 2023-12-01 RX ORDER — CARBOPROST TROMETHAMINE 250 UG/ML
250 INJECTION, SOLUTION INTRAMUSCULAR
Status: DISCONTINUED | OUTPATIENT
Start: 2023-12-01 | End: 2023-12-02 | Stop reason: HOSPADM

## 2023-12-01 RX ORDER — KETOROLAC TROMETHAMINE 30 MG/ML
30 INJECTION, SOLUTION INTRAMUSCULAR; INTRAVENOUS
Status: DISCONTINUED | OUTPATIENT
Start: 2023-12-01 | End: 2023-12-03

## 2023-12-01 RX ORDER — SODIUM CHLORIDE, SODIUM LACTATE, POTASSIUM CHLORIDE, CALCIUM CHLORIDE 600; 310; 30; 20 MG/100ML; MG/100ML; MG/100ML; MG/100ML
INJECTION, SOLUTION INTRAVENOUS CONTINUOUS
Status: DISCONTINUED | OUTPATIENT
Start: 2023-12-01 | End: 2023-12-02 | Stop reason: HOSPADM

## 2023-12-01 RX ORDER — OXYTOCIN 10 [USP'U]/ML
10 INJECTION, SOLUTION INTRAMUSCULAR; INTRAVENOUS
Status: DISCONTINUED | OUTPATIENT
Start: 2023-12-01 | End: 2023-12-02 | Stop reason: HOSPADM

## 2023-12-01 RX ORDER — FENTANYL CITRATE 50 UG/ML
100 INJECTION, SOLUTION INTRAMUSCULAR; INTRAVENOUS
Status: DISCONTINUED | OUTPATIENT
Start: 2023-12-01 | End: 2023-12-02 | Stop reason: HOSPADM

## 2023-12-01 RX ORDER — OXYTOCIN/0.9 % SODIUM CHLORIDE 30/500 ML
100-340 PLASTIC BAG, INJECTION (ML) INTRAVENOUS CONTINUOUS PRN
Status: DISCONTINUED | OUTPATIENT
Start: 2023-12-01 | End: 2023-12-02

## 2023-12-01 RX ORDER — MISOPROSTOL 200 UG/1
800 TABLET ORAL
Status: DISCONTINUED | OUTPATIENT
Start: 2023-12-01 | End: 2023-12-02 | Stop reason: HOSPADM

## 2023-12-01 RX ORDER — HYDROXYZINE HYDROCHLORIDE 25 MG/1
50 TABLET, FILM COATED ORAL
Status: DISCONTINUED | OUTPATIENT
Start: 2023-12-01 | End: 2023-12-02 | Stop reason: HOSPADM

## 2023-12-01 RX ORDER — PENICILLIN G POTASSIUM 5000000 [IU]/1
5 INJECTION, POWDER, FOR SOLUTION INTRAMUSCULAR; INTRAVENOUS ONCE
Status: COMPLETED | OUTPATIENT
Start: 2023-12-01 | End: 2023-12-02

## 2023-12-01 RX ORDER — TRANEXAMIC ACID 10 MG/ML
1 INJECTION, SOLUTION INTRAVENOUS EVERY 30 MIN PRN
Status: DISCONTINUED | OUTPATIENT
Start: 2023-12-01 | End: 2023-12-02 | Stop reason: HOSPADM

## 2023-12-01 RX ADMIN — SODIUM CHLORIDE, POTASSIUM CHLORIDE, SODIUM LACTATE AND CALCIUM CHLORIDE 500 ML: 600; 310; 30; 20 INJECTION, SOLUTION INTRAVENOUS at 23:42

## 2023-12-01 RX ADMIN — SODIUM CHLORIDE, POTASSIUM CHLORIDE, SODIUM LACTATE AND CALCIUM CHLORIDE 10 ML: 600; 310; 30; 20 INJECTION, SOLUTION INTRAVENOUS at 13:55

## 2023-12-01 RX ADMIN — HYDROXYZINE HYDROCHLORIDE 50 MG: 25 TABLET, FILM COATED ORAL at 21:22

## 2023-12-01 RX ADMIN — Medication 1 MILLI-UNITS/MIN: at 13:58

## 2023-12-01 RX ADMIN — ACETAMINOPHEN 650 MG: 325 TABLET, FILM COATED ORAL at 13:25

## 2023-12-01 RX ADMIN — FENTANYL CITRATE 100 MCG: 50 INJECTION, SOLUTION INTRAMUSCULAR; INTRAVENOUS at 23:40

## 2023-12-01 RX ADMIN — DINOPROSTONE 10 MG: 10 INSERT VAGINAL at 18:16

## 2023-12-01 ASSESSMENT — ACTIVITIES OF DAILY LIVING (ADL)
ADLS_ACUITY_SCORE: 20

## 2023-12-01 NOTE — PROGRESS NOTES
Care assumed at 1500. PT admitted for CST and induction. PT passed CST. K Sana CANO notified and orders received.

## 2023-12-01 NOTE — H&P
"OB Brief Admit H&P    Significant change in general health status.  See prenatal record and notation in the progress note.    Pt is a 18 year old  @ 38w6d who was sent over to L&D directly from clinic with a new diagnosis of oligohydramnios. She has been having weekly BPPs for elevated BMIs and resolved FGR. BPP 6/8 today with points off for fluid.     Janet is also reporting a decrease in fetal movement today. She does also have a headache that started a few hours ago and mild blurry vision. Although, she states these vision changes have been happening over the last few weeks. Denies epigastric pain or significant increase in swelling.     No contractions, vaginal bleeding or leaking of fluid.     Patient's prenatal course has been complicated by:   - Oligohydramnios (WALTER 3.4), diagnosed today (2023) in clinic   - Chlamydia infection in pregnancy: + CT at [facility], patient treated with Azithromycin, but partner didn't complete treatment. BYRON neg 23.   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   - Problem related to social environment: estranged from , protection order in place, trying to divorce, SW referral placed, current boyfriend is FOB   - Morbid obesity: BMI 40 at NOB. A1C 5.1% at B. Early and 28 wk glucose screenings passed   - Aspirin indicated: elevated BMI and nullip   - S/P Flu and Tdap vaccines   - Rubella non-immune: plan MMR PP   - GBS positive - PCN in labor     Prenatal Labs:    Blood type  O pos  Rubella non immune  GCT passed  GBS positive    EFW: 7 lbs    BP (!) 144/88 (BP Location: Left arm)   Temp 99  F (37.2  C) (Temporal)   Resp 18   Ht 1.6 m (5' 3\")   Wt 123.8 kg (273 lb)   LMP 2023   Breastfeeding No   BMI 48.36 kg/m    EFM:  Cat I FHTs, " reactive  [place]: none  SVE: 1.5/50%/-2  Membranes:  intact    Assessment:  18 year old  @ 38w6d admitted for oligohydramnios    Plan:   1) Admit to labor and delivery    2) Oligohydramnios - Contraction stress test was passed this afternoon. Plan for RN to place cervidil 1 hr following completion of CST. Reviewed induction plan in with Dr. Rose who is co-managing this patient's care. Will consider PO cytotec if cervix unchanged and still needing ripening following cervidil removal.    3) Preeclampsia without severe features (p/c ratio 0.38, LFTs wnl, plts wnl). Discussed with Dr. Rose, will continue co-management unless patient requires magnesium.    -  repeat labs in AM  4) Rubella non-immune - planning MMR PP  5) GBS positive - PCN in active labor  6) Revaluate as indicated      FREIDA Calabrese CNM on 2023 at 5:34 PM

## 2023-12-02 ENCOUNTER — ANESTHESIA (OUTPATIENT)
Dept: OBGYN | Facility: CLINIC | Age: 18
End: 2023-12-02
Payer: COMMERCIAL

## 2023-12-02 ENCOUNTER — ANESTHESIA EVENT (OUTPATIENT)
Dept: OBGYN | Facility: CLINIC | Age: 18
End: 2023-12-02
Payer: COMMERCIAL

## 2023-12-02 PROBLEM — Z34.90 ENCOUNTER FOR INDUCTION OF LABOR: Status: ACTIVE | Noted: 2023-12-02

## 2023-12-02 LAB
ALT SERPL W P-5'-P-CCNC: 13 U/L (ref 0–50)
AST SERPL W P-5'-P-CCNC: 13 U/L (ref 0–45)
CREAT SERPL-MCNC: 0.49 MG/DL (ref 0.51–0.95)
EGFRCR SERPLBLD CKD-EPI 2021: >90 ML/MIN/1.73M2
ERYTHROCYTE [DISTWIDTH] IN BLOOD BY AUTOMATED COUNT: 13.9 % (ref 10–15)
HCT VFR BLD AUTO: 33.2 % (ref 35–47)
HGB BLD-MCNC: 11.3 G/DL (ref 11.7–15.7)
MCH RBC QN AUTO: 31 PG (ref 26.5–33)
MCHC RBC AUTO-ENTMCNC: 34 G/DL (ref 31.5–36.5)
MCV RBC AUTO: 91 FL (ref 78–100)
PLATELET # BLD AUTO: 215 10E3/UL (ref 150–450)
RBC # BLD AUTO: 3.65 10E6/UL (ref 3.8–5.2)
T PALLIDUM AB SER QL: NONREACTIVE
WBC # BLD AUTO: 13.1 10E3/UL (ref 4–11)

## 2023-12-02 PROCEDURE — 250N000009 HC RX 250

## 2023-12-02 PROCEDURE — 250N000011 HC RX IP 250 OP 636: Performed by: ANESTHESIOLOGY

## 2023-12-02 PROCEDURE — 00HU33Z INSERTION OF INFUSION DEVICE INTO SPINAL CANAL, PERCUTANEOUS APPROACH: ICD-10-PCS | Performed by: ANESTHESIOLOGY

## 2023-12-02 PROCEDURE — 999N000016 HC STATISTIC ATTENDANCE AT DELIVERY

## 2023-12-02 PROCEDURE — 96372 THER/PROPH/DIAG INJ SC/IM: CPT | Performed by: OBSTETRICS & GYNECOLOGY

## 2023-12-02 PROCEDURE — 250N000011 HC RX IP 250 OP 636: Mod: JZ | Performed by: ANESTHESIOLOGY

## 2023-12-02 PROCEDURE — 250N000011 HC RX IP 250 OP 636

## 2023-12-02 PROCEDURE — 710N000010 HC RECOVERY PHASE 1, LEVEL 2, PER MIN: Performed by: OBSTETRICS & GYNECOLOGY

## 2023-12-02 PROCEDURE — 250N000011 HC RX IP 250 OP 636: Mod: JZ

## 2023-12-02 PROCEDURE — 370N000017 HC ANESTHESIA TECHNICAL FEE, PER MIN: Performed by: OBSTETRICS & GYNECOLOGY

## 2023-12-02 PROCEDURE — 3E0P7VZ INTRODUCTION OF HORMONE INTO FEMALE REPRODUCTIVE, VIA NATURAL OR ARTIFICIAL OPENING: ICD-10-PCS | Performed by: OBSTETRICS & GYNECOLOGY

## 2023-12-02 PROCEDURE — 82565 ASSAY OF CREATININE: CPT | Performed by: OBSTETRICS & GYNECOLOGY

## 2023-12-02 PROCEDURE — 84450 TRANSFERASE (AST) (SGOT): CPT | Performed by: OBSTETRICS & GYNECOLOGY

## 2023-12-02 PROCEDURE — 258N000003 HC RX IP 258 OP 636: Performed by: ANESTHESIOLOGY

## 2023-12-02 PROCEDURE — 85027 COMPLETE CBC AUTOMATED: CPT | Performed by: OBSTETRICS & GYNECOLOGY

## 2023-12-02 PROCEDURE — 250N000009 HC RX 250: Performed by: OBSTETRICS & GYNECOLOGY

## 2023-12-02 PROCEDURE — 88307 TISSUE EXAM BY PATHOLOGIST: CPT | Mod: TC | Performed by: OBSTETRICS & GYNECOLOGY

## 2023-12-02 PROCEDURE — 84460 ALANINE AMINO (ALT) (SGPT): CPT | Performed by: OBSTETRICS & GYNECOLOGY

## 2023-12-02 PROCEDURE — 250N000013 HC RX MED GY IP 250 OP 250 PS 637: Performed by: OBSTETRICS & GYNECOLOGY

## 2023-12-02 PROCEDURE — 88307 TISSUE EXAM BY PATHOLOGIST: CPT | Mod: 26 | Performed by: STUDENT IN AN ORGANIZED HEALTH CARE EDUCATION/TRAINING PROGRAM

## 2023-12-02 PROCEDURE — 120N000012 HC R&B POSTPARTUM

## 2023-12-02 PROCEDURE — 272N000001 HC OR GENERAL SUPPLY STERILE: Performed by: OBSTETRICS & GYNECOLOGY

## 2023-12-02 PROCEDURE — 250N000009 HC RX 250: Performed by: ANESTHESIOLOGY

## 2023-12-02 PROCEDURE — 250N000011 HC RX IP 250 OP 636: Mod: JZ | Performed by: OBSTETRICS & GYNECOLOGY

## 2023-12-02 PROCEDURE — 360N000076 HC SURGERY LEVEL 3, PER MIN: Performed by: OBSTETRICS & GYNECOLOGY

## 2023-12-02 PROCEDURE — 250N000011 HC RX IP 250 OP 636: Performed by: OBSTETRICS & GYNECOLOGY

## 2023-12-02 PROCEDURE — 258N000003 HC RX IP 258 OP 636: Performed by: OBSTETRICS & GYNECOLOGY

## 2023-12-02 PROCEDURE — 36415 COLL VENOUS BLD VENIPUNCTURE: CPT | Performed by: OBSTETRICS & GYNECOLOGY

## 2023-12-02 PROCEDURE — 710N000009 HC RECOVERY PHASE 1, LEVEL 1, PER MIN: Performed by: OBSTETRICS & GYNECOLOGY

## 2023-12-02 PROCEDURE — 3E0R3BZ INTRODUCTION OF ANESTHETIC AGENT INTO SPINAL CANAL, PERCUTANEOUS APPROACH: ICD-10-PCS | Performed by: ANESTHESIOLOGY

## 2023-12-02 RX ORDER — OXYCODONE HYDROCHLORIDE 5 MG/1
5 TABLET ORAL EVERY 4 HOURS PRN
Status: DISCONTINUED | OUTPATIENT
Start: 2023-12-02 | End: 2023-12-05 | Stop reason: HOSPADM

## 2023-12-02 RX ORDER — LIDOCAINE 40 MG/G
CREAM TOPICAL
Status: DISCONTINUED | OUTPATIENT
Start: 2023-12-02 | End: 2023-12-05 | Stop reason: HOSPADM

## 2023-12-02 RX ORDER — MISOPROSTOL 200 UG/1
400 TABLET ORAL
Status: DISCONTINUED | OUTPATIENT
Start: 2023-12-02 | End: 2023-12-02 | Stop reason: HOSPADM

## 2023-12-02 RX ORDER — ONDANSETRON 2 MG/ML
INJECTION INTRAMUSCULAR; INTRAVENOUS PRN
Status: DISCONTINUED | OUTPATIENT
Start: 2023-12-02 | End: 2023-12-02

## 2023-12-02 RX ORDER — MAGNESIUM SULFATE HEPTAHYDRATE 40 MG/ML
4 INJECTION, SOLUTION INTRAVENOUS
Status: DISCONTINUED | OUTPATIENT
Start: 2023-12-02 | End: 2023-12-05 | Stop reason: HOSPADM

## 2023-12-02 RX ORDER — OXYTOCIN 10 [USP'U]/ML
10 INJECTION, SOLUTION INTRAMUSCULAR; INTRAVENOUS
Status: DISCONTINUED | OUTPATIENT
Start: 2023-12-02 | End: 2023-12-02 | Stop reason: HOSPADM

## 2023-12-02 RX ORDER — MORPHINE SULFATE 1 MG/ML
INJECTION, SOLUTION EPIDURAL; INTRATHECAL; INTRAVENOUS PRN
Status: DISCONTINUED | OUTPATIENT
Start: 2023-12-02 | End: 2023-12-02

## 2023-12-02 RX ORDER — CARBOPROST TROMETHAMINE 250 UG/ML
INJECTION, SOLUTION INTRAMUSCULAR PRN
Status: DISCONTINUED | OUTPATIENT
Start: 2023-12-02 | End: 2023-12-02

## 2023-12-02 RX ORDER — IBUPROFEN 400 MG/1
800 TABLET, FILM COATED ORAL EVERY 6 HOURS
Status: DISCONTINUED | OUTPATIENT
Start: 2023-12-03 | End: 2023-12-05 | Stop reason: HOSPADM

## 2023-12-02 RX ORDER — CEFAZOLIN SODIUM/WATER 3 G/30 ML
SYRINGE (ML) INTRAVENOUS
Status: DISCONTINUED
Start: 2023-12-02 | End: 2023-12-02 | Stop reason: HOSPADM

## 2023-12-02 RX ORDER — MAGNESIUM SULFATE HEPTAHYDRATE 40 MG/ML
2 INJECTION, SOLUTION INTRAVENOUS
Status: DISCONTINUED | OUTPATIENT
Start: 2023-12-02 | End: 2023-12-05 | Stop reason: HOSPADM

## 2023-12-02 RX ORDER — MAGNESIUM SULFATE IN WATER 40 MG/ML
2 INJECTION, SOLUTION INTRAVENOUS CONTINUOUS
Status: DISPENSED | OUTPATIENT
Start: 2023-12-02 | End: 2023-12-03

## 2023-12-02 RX ORDER — DIPHENHYDRAMINE HCL 25 MG
25 CAPSULE ORAL EVERY 6 HOURS PRN
Status: DISCONTINUED | OUTPATIENT
Start: 2023-12-02 | End: 2023-12-05 | Stop reason: HOSPADM

## 2023-12-02 RX ORDER — OXYTOCIN 10 [USP'U]/ML
10 INJECTION, SOLUTION INTRAMUSCULAR; INTRAVENOUS
Status: DISCONTINUED | OUTPATIENT
Start: 2023-12-02 | End: 2023-12-05 | Stop reason: HOSPADM

## 2023-12-02 RX ORDER — CARBOPROST TROMETHAMINE 250 UG/ML
INJECTION, SOLUTION INTRAMUSCULAR
Status: COMPLETED
Start: 2023-12-02 | End: 2023-12-02

## 2023-12-02 RX ORDER — SODIUM CHLORIDE, SODIUM LACTATE, POTASSIUM CHLORIDE, CALCIUM CHLORIDE 600; 310; 30; 20 MG/100ML; MG/100ML; MG/100ML; MG/100ML
INJECTION, SOLUTION INTRAVENOUS CONTINUOUS
Status: DISCONTINUED | OUTPATIENT
Start: 2023-12-02 | End: 2023-12-02 | Stop reason: HOSPADM

## 2023-12-02 RX ORDER — OXYTOCIN/0.9 % SODIUM CHLORIDE 30/500 ML
100-340 PLASTIC BAG, INJECTION (ML) INTRAVENOUS CONTINUOUS PRN
Status: DISCONTINUED | OUTPATIENT
Start: 2023-12-02 | End: 2023-12-02

## 2023-12-02 RX ORDER — MISOPROSTOL 200 UG/1
800 TABLET ORAL
Status: DISCONTINUED | OUTPATIENT
Start: 2023-12-02 | End: 2023-12-05 | Stop reason: HOSPADM

## 2023-12-02 RX ORDER — ACETAMINOPHEN 325 MG/1
975 TABLET ORAL EVERY 6 HOURS
Status: DISCONTINUED | OUTPATIENT
Start: 2023-12-02 | End: 2023-12-05 | Stop reason: HOSPADM

## 2023-12-02 RX ORDER — MODIFIED LANOLIN
OINTMENT (GRAM) TOPICAL
Status: DISCONTINUED | OUTPATIENT
Start: 2023-12-02 | End: 2023-12-05 | Stop reason: HOSPADM

## 2023-12-02 RX ORDER — PROCHLORPERAZINE MALEATE 10 MG
10 TABLET ORAL EVERY 6 HOURS PRN
Status: DISCONTINUED | OUTPATIENT
Start: 2023-12-02 | End: 2023-12-05 | Stop reason: HOSPADM

## 2023-12-02 RX ORDER — DIPHENHYDRAMINE HYDROCHLORIDE 50 MG/ML
25 INJECTION INTRAMUSCULAR; INTRAVENOUS EVERY 6 HOURS PRN
Status: DISCONTINUED | OUTPATIENT
Start: 2023-12-02 | End: 2023-12-05 | Stop reason: HOSPADM

## 2023-12-02 RX ORDER — NALOXONE HYDROCHLORIDE 0.4 MG/ML
0.4 INJECTION, SOLUTION INTRAMUSCULAR; INTRAVENOUS; SUBCUTANEOUS
Status: DISCONTINUED | OUTPATIENT
Start: 2023-12-02 | End: 2023-12-05 | Stop reason: HOSPADM

## 2023-12-02 RX ORDER — AMOXICILLIN 250 MG
2 CAPSULE ORAL 2 TIMES DAILY
Status: DISCONTINUED | OUTPATIENT
Start: 2023-12-02 | End: 2023-12-05 | Stop reason: HOSPADM

## 2023-12-02 RX ORDER — MISOPROSTOL 200 UG/1
400 TABLET ORAL
Status: DISCONTINUED | OUTPATIENT
Start: 2023-12-02 | End: 2023-12-05 | Stop reason: HOSPADM

## 2023-12-02 RX ORDER — METHYLERGONOVINE MALEATE 0.2 MG/ML
200 INJECTION INTRAVENOUS
Status: DISCONTINUED | OUTPATIENT
Start: 2023-12-02 | End: 2023-12-05 | Stop reason: HOSPADM

## 2023-12-02 RX ORDER — CALCIUM GLUCONATE 94 MG/ML
1 INJECTION, SOLUTION INTRAVENOUS
Status: DISCONTINUED | OUTPATIENT
Start: 2023-12-02 | End: 2023-12-05 | Stop reason: HOSPADM

## 2023-12-02 RX ORDER — METHYLERGONOVINE MALEATE 0.2 MG/ML
200 INJECTION INTRAVENOUS
Status: DISCONTINUED | OUTPATIENT
Start: 2023-12-02 | End: 2023-12-02 | Stop reason: HOSPADM

## 2023-12-02 RX ORDER — TERBUTALINE SULFATE 1 MG/ML
0.25 INJECTION, SOLUTION SUBCUTANEOUS ONCE
Status: COMPLETED | OUTPATIENT
Start: 2023-12-02 | End: 2023-12-02

## 2023-12-02 RX ORDER — PROCHLORPERAZINE 25 MG
25 SUPPOSITORY, RECTAL RECTAL EVERY 12 HOURS PRN
Status: DISCONTINUED | OUTPATIENT
Start: 2023-12-02 | End: 2023-12-05 | Stop reason: HOSPADM

## 2023-12-02 RX ORDER — CEFAZOLIN SODIUM/WATER 3 G/30 ML
3 SYRINGE (ML) INTRAVENOUS SEE ADMIN INSTRUCTIONS
Status: DISCONTINUED | OUTPATIENT
Start: 2023-12-02 | End: 2023-12-02 | Stop reason: HOSPADM

## 2023-12-02 RX ORDER — ONDANSETRON 2 MG/ML
4 INJECTION INTRAMUSCULAR; INTRAVENOUS EVERY 6 HOURS PRN
Status: DISCONTINUED | OUTPATIENT
Start: 2023-12-02 | End: 2023-12-05 | Stop reason: HOSPADM

## 2023-12-02 RX ORDER — NALBUPHINE HYDROCHLORIDE 20 MG/ML
2.5-5 INJECTION, SOLUTION INTRAMUSCULAR; INTRAVENOUS; SUBCUTANEOUS EVERY 6 HOURS PRN
Status: DISCONTINUED | OUTPATIENT
Start: 2023-12-02 | End: 2023-12-03

## 2023-12-02 RX ORDER — OXYTOCIN/0.9 % SODIUM CHLORIDE 30/500 ML
PLASTIC BAG, INJECTION (ML) INTRAVENOUS CONTINUOUS PRN
Status: DISCONTINUED | OUTPATIENT
Start: 2023-12-02 | End: 2023-12-02

## 2023-12-02 RX ORDER — AZITHROMYCIN 500 MG/1
500 INJECTION, POWDER, LYOPHILIZED, FOR SOLUTION INTRAVENOUS
Status: COMPLETED | OUTPATIENT
Start: 2023-12-02 | End: 2023-12-02

## 2023-12-02 RX ORDER — MAGNESIUM SULFATE HEPTAHYDRATE 40 MG/ML
INJECTION, SOLUTION INTRAVENOUS
Status: COMPLETED
Start: 2023-12-02 | End: 2023-12-02

## 2023-12-02 RX ORDER — SODIUM CHLORIDE, SODIUM LACTATE, POTASSIUM CHLORIDE, CALCIUM CHLORIDE 600; 310; 30; 20 MG/100ML; MG/100ML; MG/100ML; MG/100ML
INJECTION, SOLUTION INTRAVENOUS CONTINUOUS PRN
Status: DISCONTINUED | OUTPATIENT
Start: 2023-12-02 | End: 2023-12-02 | Stop reason: HOSPADM

## 2023-12-02 RX ORDER — CARBOPROST TROMETHAMINE 250 UG/ML
250 INJECTION, SOLUTION INTRAMUSCULAR
Status: DISCONTINUED | OUTPATIENT
Start: 2023-12-02 | End: 2023-12-02 | Stop reason: HOSPADM

## 2023-12-02 RX ORDER — CITRIC ACID/SODIUM CITRATE 334-500MG
30 SOLUTION, ORAL ORAL
Status: COMPLETED | OUTPATIENT
Start: 2023-12-02 | End: 2023-12-02

## 2023-12-02 RX ORDER — ROPIVACAINE HYDROCHLORIDE 2 MG/ML
INJECTION, SOLUTION EPIDURAL; INFILTRATION; PERINEURAL
Status: COMPLETED | OUTPATIENT
Start: 2023-12-02 | End: 2023-12-02

## 2023-12-02 RX ORDER — OXYTOCIN/0.9 % SODIUM CHLORIDE 30/500 ML
1-24 PLASTIC BAG, INJECTION (ML) INTRAVENOUS CONTINUOUS
Status: DISCONTINUED | OUTPATIENT
Start: 2023-12-02 | End: 2023-12-02

## 2023-12-02 RX ORDER — ENOXAPARIN SODIUM 100 MG/ML
60 INJECTION SUBCUTANEOUS EVERY 12 HOURS
Status: DISCONTINUED | OUTPATIENT
Start: 2023-12-03 | End: 2023-12-05 | Stop reason: HOSPADM

## 2023-12-02 RX ORDER — ONDANSETRON 4 MG/1
4 TABLET, ORALLY DISINTEGRATING ORAL EVERY 6 HOURS PRN
Status: DISCONTINUED | OUTPATIENT
Start: 2023-12-02 | End: 2023-12-05 | Stop reason: HOSPADM

## 2023-12-02 RX ORDER — NALOXONE HYDROCHLORIDE 0.4 MG/ML
0.2 INJECTION, SOLUTION INTRAMUSCULAR; INTRAVENOUS; SUBCUTANEOUS
Status: DISCONTINUED | OUTPATIENT
Start: 2023-12-02 | End: 2023-12-05 | Stop reason: HOSPADM

## 2023-12-02 RX ORDER — HYDRALAZINE HYDROCHLORIDE 20 MG/ML
10 INJECTION INTRAMUSCULAR; INTRAVENOUS
Status: DISCONTINUED | OUTPATIENT
Start: 2023-12-02 | End: 2023-12-05 | Stop reason: HOSPADM

## 2023-12-02 RX ORDER — SODIUM CHLORIDE, SODIUM LACTATE, POTASSIUM CHLORIDE, CALCIUM CHLORIDE 600; 310; 30; 20 MG/100ML; MG/100ML; MG/100ML; MG/100ML
INJECTION, SOLUTION INTRAVENOUS CONTINUOUS PRN
Status: DISCONTINUED | OUTPATIENT
Start: 2023-12-02 | End: 2023-12-02

## 2023-12-02 RX ORDER — AZITHROMYCIN 500 MG/1
INJECTION, POWDER, LYOPHILIZED, FOR SOLUTION INTRAVENOUS
Status: DISCONTINUED
Start: 2023-12-02 | End: 2023-12-02 | Stop reason: HOSPADM

## 2023-12-02 RX ORDER — CHLOROPROCAINE HYDROCHLORIDE 30 MG/ML
INJECTION, SOLUTION EPIDURAL; INFILTRATION; INTRACAUDAL; PERINEURAL PRN
Status: DISCONTINUED | OUTPATIENT
Start: 2023-12-02 | End: 2023-12-02

## 2023-12-02 RX ORDER — LABETALOL HYDROCHLORIDE 5 MG/ML
20-80 INJECTION, SOLUTION INTRAVENOUS EVERY 10 MIN PRN
Status: DISCONTINUED | OUTPATIENT
Start: 2023-12-02 | End: 2023-12-05 | Stop reason: HOSPADM

## 2023-12-02 RX ORDER — LIDOCAINE 40 MG/G
CREAM TOPICAL
Status: DISCONTINUED | OUTPATIENT
Start: 2023-12-02 | End: 2023-12-02

## 2023-12-02 RX ORDER — HYDROCORTISONE 25 MG/G
CREAM TOPICAL 3 TIMES DAILY PRN
Status: DISCONTINUED | OUTPATIENT
Start: 2023-12-02 | End: 2023-12-05 | Stop reason: HOSPADM

## 2023-12-02 RX ORDER — FENTANYL CITRATE 50 UG/ML
INJECTION, SOLUTION INTRAMUSCULAR; INTRAVENOUS PRN
Status: DISCONTINUED | OUTPATIENT
Start: 2023-12-02 | End: 2023-12-02

## 2023-12-02 RX ORDER — ROPIVACAINE HYDROCHLORIDE 2 MG/ML
10 INJECTION, SOLUTION EPIDURAL; INFILTRATION; PERINEURAL ONCE
Status: DISCONTINUED | OUTPATIENT
Start: 2023-12-02 | End: 2023-12-02 | Stop reason: HOSPADM

## 2023-12-02 RX ORDER — TRANEXAMIC ACID 10 MG/ML
1 INJECTION, SOLUTION INTRAVENOUS EVERY 30 MIN PRN
Status: DISCONTINUED | OUTPATIENT
Start: 2023-12-02 | End: 2023-12-02 | Stop reason: HOSPADM

## 2023-12-02 RX ORDER — ACETAMINOPHEN 325 MG/1
975 TABLET ORAL ONCE
Status: COMPLETED | OUTPATIENT
Start: 2023-12-02 | End: 2023-12-02

## 2023-12-02 RX ORDER — LABETALOL HYDROCHLORIDE 5 MG/ML
INJECTION, SOLUTION INTRAVENOUS
Status: DISCONTINUED
Start: 2023-12-02 | End: 2023-12-02 | Stop reason: HOSPADM

## 2023-12-02 RX ORDER — KETOROLAC TROMETHAMINE 30 MG/ML
30 INJECTION, SOLUTION INTRAMUSCULAR; INTRAVENOUS EVERY 6 HOURS
Status: COMPLETED | OUTPATIENT
Start: 2023-12-03 | End: 2023-12-03

## 2023-12-02 RX ORDER — MISOPROSTOL 200 UG/1
800 TABLET ORAL
Status: DISCONTINUED | OUTPATIENT
Start: 2023-12-02 | End: 2023-12-02 | Stop reason: HOSPADM

## 2023-12-02 RX ORDER — DEXTROSE, SODIUM CHLORIDE, SODIUM LACTATE, POTASSIUM CHLORIDE, AND CALCIUM CHLORIDE 5; .6; .31; .03; .02 G/100ML; G/100ML; G/100ML; G/100ML; G/100ML
INJECTION, SOLUTION INTRAVENOUS CONTINUOUS
Status: DISCONTINUED | OUTPATIENT
Start: 2023-12-02 | End: 2023-12-05 | Stop reason: HOSPADM

## 2023-12-02 RX ORDER — TRANEXAMIC ACID 10 MG/ML
1 INJECTION, SOLUTION INTRAVENOUS EVERY 30 MIN PRN
Status: DISCONTINUED | OUTPATIENT
Start: 2023-12-02 | End: 2023-12-05 | Stop reason: HOSPADM

## 2023-12-02 RX ORDER — AMOXICILLIN 250 MG
1 CAPSULE ORAL 2 TIMES DAILY
Status: DISCONTINUED | OUTPATIENT
Start: 2023-12-02 | End: 2023-12-05 | Stop reason: HOSPADM

## 2023-12-02 RX ORDER — OXYTOCIN 10 [USP'U]/ML
10 INJECTION, SOLUTION INTRAMUSCULAR; INTRAVENOUS
Status: DISCONTINUED | OUTPATIENT
Start: 2023-12-02 | End: 2023-12-02

## 2023-12-02 RX ORDER — CEFAZOLIN SODIUM/WATER 3 G/30 ML
3 SYRINGE (ML) INTRAVENOUS
Status: COMPLETED | OUTPATIENT
Start: 2023-12-02 | End: 2023-12-02

## 2023-12-02 RX ORDER — LIDOCAINE 40 MG/G
CREAM TOPICAL
Status: DISCONTINUED | OUTPATIENT
Start: 2023-12-02 | End: 2023-12-02 | Stop reason: HOSPADM

## 2023-12-02 RX ORDER — MAGNESIUM SULFATE HEPTAHYDRATE 40 MG/ML
4 INJECTION, SOLUTION INTRAVENOUS ONCE
Status: COMPLETED | OUTPATIENT
Start: 2023-12-02 | End: 2023-12-02

## 2023-12-02 RX ORDER — OXYTOCIN/0.9 % SODIUM CHLORIDE 30/500 ML
1-24 PLASTIC BAG, INJECTION (ML) INTRAVENOUS CONTINUOUS
Status: DISCONTINUED | OUTPATIENT
Start: 2023-12-02 | End: 2023-12-02 | Stop reason: HOSPADM

## 2023-12-02 RX ORDER — OXYTOCIN/0.9 % SODIUM CHLORIDE 30/500 ML
340 PLASTIC BAG, INJECTION (ML) INTRAVENOUS CONTINUOUS PRN
Status: DISCONTINUED | OUTPATIENT
Start: 2023-12-02 | End: 2023-12-05 | Stop reason: HOSPADM

## 2023-12-02 RX ORDER — METOCLOPRAMIDE HYDROCHLORIDE 5 MG/ML
10 INJECTION INTRAMUSCULAR; INTRAVENOUS EVERY 6 HOURS PRN
Status: DISCONTINUED | OUTPATIENT
Start: 2023-12-02 | End: 2023-12-05 | Stop reason: HOSPADM

## 2023-12-02 RX ORDER — MAGNESIUM SULFATE IN WATER 40 MG/ML
INJECTION, SOLUTION INTRAVENOUS
Status: COMPLETED
Start: 2023-12-02 | End: 2023-12-02

## 2023-12-02 RX ORDER — METOCLOPRAMIDE 10 MG/1
10 TABLET ORAL EVERY 6 HOURS PRN
Status: DISCONTINUED | OUTPATIENT
Start: 2023-12-02 | End: 2023-12-05 | Stop reason: HOSPADM

## 2023-12-02 RX ORDER — OXYTOCIN/0.9 % SODIUM CHLORIDE 30/500 ML
340 PLASTIC BAG, INJECTION (ML) INTRAVENOUS CONTINUOUS PRN
Status: DISCONTINUED | OUTPATIENT
Start: 2023-12-02 | End: 2023-12-02 | Stop reason: HOSPADM

## 2023-12-02 RX ORDER — SODIUM CHLORIDE, SODIUM LACTATE, POTASSIUM CHLORIDE, CALCIUM CHLORIDE 600; 310; 30; 20 MG/100ML; MG/100ML; MG/100ML; MG/100ML
10-125 INJECTION, SOLUTION INTRAVENOUS CONTINUOUS
Status: DISCONTINUED | OUTPATIENT
Start: 2023-12-02 | End: 2023-12-05 | Stop reason: HOSPADM

## 2023-12-02 RX ORDER — SIMETHICONE 80 MG
80 TABLET,CHEWABLE ORAL 4 TIMES DAILY PRN
Status: DISCONTINUED | OUTPATIENT
Start: 2023-12-02 | End: 2023-12-05 | Stop reason: HOSPADM

## 2023-12-02 RX ORDER — BISACODYL 10 MG
10 SUPPOSITORY, RECTAL RECTAL DAILY PRN
Status: DISCONTINUED | OUTPATIENT
Start: 2023-12-04 | End: 2023-12-05 | Stop reason: HOSPADM

## 2023-12-02 RX ORDER — EPHEDRINE SULFATE 50 MG/ML
5 INJECTION, SOLUTION INTRAMUSCULAR; INTRAVENOUS; SUBCUTANEOUS
Status: DISCONTINUED | OUTPATIENT
Start: 2023-12-02 | End: 2023-12-02 | Stop reason: HOSPADM

## 2023-12-02 RX ORDER — CARBOPROST TROMETHAMINE 250 UG/ML
250 INJECTION, SOLUTION INTRAMUSCULAR
Status: DISCONTINUED | OUTPATIENT
Start: 2023-12-02 | End: 2023-12-05 | Stop reason: HOSPADM

## 2023-12-02 RX ADMIN — SODIUM CHLORIDE, POTASSIUM CHLORIDE, SODIUM LACTATE AND CALCIUM CHLORIDE 75 ML/HR: 600; 310; 30; 20 INJECTION, SOLUTION INTRAVENOUS at 20:50

## 2023-12-02 RX ADMIN — ROPIVACAINE HYDROCHLORIDE 10 ML: 2 INJECTION, SOLUTION EPIDURAL; INFILTRATION at 09:46

## 2023-12-02 RX ADMIN — AZITHROMYCIN MONOHYDRATE 500 MG: 500 INJECTION, POWDER, LYOPHILIZED, FOR SOLUTION INTRAVENOUS at 16:11

## 2023-12-02 RX ADMIN — Medication 3 G: at 16:32

## 2023-12-02 RX ADMIN — PHENYLEPHRINE HYDROCHLORIDE 100 MCG: 10 INJECTION INTRAVENOUS at 17:08

## 2023-12-02 RX ADMIN — MAGNESIUM SULFATE HEPTAHYDRATE 4 G: 40 INJECTION, SOLUTION INTRAVENOUS at 05:08

## 2023-12-02 RX ADMIN — TRANEXAMIC ACID 1 G: 10 INJECTION, SOLUTION INTRAVENOUS at 17:26

## 2023-12-02 RX ADMIN — TERBUTALINE SULFATE 0.25 MG: 1 INJECTION, SOLUTION SUBCUTANEOUS at 03:16

## 2023-12-02 RX ADMIN — FENTANYL CITRATE 100 MCG: 50 INJECTION, SOLUTION INTRAMUSCULAR; INTRAVENOUS at 02:26

## 2023-12-02 RX ADMIN — FENTANYL CITRATE 100 MCG: 50 INJECTION, SOLUTION INTRAMUSCULAR; INTRAVENOUS at 08:44

## 2023-12-02 RX ADMIN — FENTANYL CITRATE 50 MCG: 50 INJECTION INTRAMUSCULAR; INTRAVENOUS at 17:02

## 2023-12-02 RX ADMIN — SODIUM CHLORIDE, POTASSIUM CHLORIDE, SODIUM LACTATE AND CALCIUM CHLORIDE 100 ML/HR: 600; 310; 30; 20 INJECTION, SOLUTION INTRAVENOUS at 05:11

## 2023-12-02 RX ADMIN — MAGNESIUM SULFATE HEPTAHYDRATE 2 G/HR: 40 INJECTION, SOLUTION INTRAVENOUS at 15:44

## 2023-12-02 RX ADMIN — PHENYLEPHRINE HYDROCHLORIDE 100 MCG: 10 INJECTION INTRAVENOUS at 17:14

## 2023-12-02 RX ADMIN — ONDANSETRON 4 MG: 2 INJECTION INTRAMUSCULAR; INTRAVENOUS at 17:03

## 2023-12-02 RX ADMIN — PENICILLIN G 3 MILLION UNITS: 3000000 INJECTION, SOLUTION INTRAVENOUS at 09:33

## 2023-12-02 RX ADMIN — CARBOPROST TROMETHAMINE 250 MCG: 250 INJECTION, SOLUTION INTRAMUSCULAR at 17:22

## 2023-12-02 RX ADMIN — Medication 340 ML/HR: at 17:01

## 2023-12-02 RX ADMIN — LABETALOL HYDROCHLORIDE 20 MG: 5 INJECTION, SOLUTION INTRAVENOUS at 05:03

## 2023-12-02 RX ADMIN — ACETAMINOPHEN 975 MG: 325 TABLET, FILM COATED ORAL at 22:11

## 2023-12-02 RX ADMIN — SODIUM CHLORIDE, POTASSIUM CHLORIDE, SODIUM LACTATE AND CALCIUM CHLORIDE: 600; 310; 30; 20 INJECTION, SOLUTION INTRAVENOUS at 16:33

## 2023-12-02 RX ADMIN — PENICILLIN G POTASSIUM 5 MILLION UNITS: 5000000 POWDER, FOR SOLUTION INTRAMUSCULAR; INTRAPLEURAL; INTRATHECAL; INTRAVENOUS at 05:20

## 2023-12-02 RX ADMIN — PHENYLEPHRINE HYDROCHLORIDE 0.5 MCG/KG/MIN: 10 INJECTION INTRAVENOUS at 16:56

## 2023-12-02 RX ADMIN — MAGNESIUM SULFATE HEPTAHYDRATE 2 G/HR: 40 INJECTION, SOLUTION INTRAVENOUS at 05:40

## 2023-12-02 RX ADMIN — SODIUM CITRATE AND CITRIC ACID MONOHYDRATE 30 ML: 500; 334 SOLUTION ORAL at 16:13

## 2023-12-02 RX ADMIN — ACETAMINOPHEN 975 MG: 325 TABLET, FILM COATED ORAL at 16:12

## 2023-12-02 RX ADMIN — Medication: at 09:45

## 2023-12-02 RX ADMIN — MORPHINE SULFATE 2.5 MG: 1 INJECTION, SOLUTION EPIDURAL; INTRATHECAL; INTRAVENOUS at 17:00

## 2023-12-02 RX ADMIN — CHLOROPROCAINE HYDROCHLORIDE 7.5 ML: 30 INJECTION, SOLUTION EPIDURAL; INFILTRATION; INTRACAUDAL; PERINEURAL at 17:00

## 2023-12-02 RX ADMIN — PENICILLIN G 3 MILLION UNITS: 3000000 INJECTION, SOLUTION INTRAVENOUS at 13:29

## 2023-12-02 RX ADMIN — CHLOROPROCAINE HYDROCHLORIDE 20 ML: 30 INJECTION, SOLUTION EPIDURAL; INFILTRATION; INTRACAUDAL; PERINEURAL at 16:40

## 2023-12-02 RX ADMIN — PHENYLEPHRINE HYDROCHLORIDE 100 MCG: 10 INJECTION INTRAVENOUS at 17:07

## 2023-12-02 RX ADMIN — FENTANYL CITRATE 50 MCG: 50 INJECTION INTRAMUSCULAR; INTRAVENOUS at 17:03

## 2023-12-02 RX ADMIN — Medication 2 MILLI-UNITS/MIN: at 14:45

## 2023-12-02 ASSESSMENT — ACTIVITIES OF DAILY LIVING (ADL)
ADLS_ACUITY_SCORE: 20
ADLS_ACUITY_SCORE: 20
ADLS_ACUITY_SCORE: 24
ADLS_ACUITY_SCORE: 20
ADLS_ACUITY_SCORE: 24
ADLS_ACUITY_SCORE: 20
ADLS_ACUITY_SCORE: 24
ADLS_ACUITY_SCORE: 24

## 2023-12-02 NOTE — PROGRESS NOTES
"Back in to reevaluate patient. Still feeling pressure. Eager to see if she is complete.     Pitocin @ 2mU/min.    O: /71   Temp 98.8  F (37.1  C) (Temporal)   Resp 18   Ht 1.6 m (5' 3\")   Wt 124.2 kg (273 lb 12.8 oz)   LMP 03/04/2023   SpO2 97%   Breastfeeding No   BMI 48.50 kg/m      FHT: 130 with moderate variability. +accels with scalp stim. Recurrent late decels.  Joice: q4-5minutes  SVE: 9/80/0, increasing caput; cervix palpably more swollen, not reducible with pushing x 2    A/P: Category II FHT @ 9cm in the setting of inadequate ctx and in ability to further augment with pitocin. Technically does not yet meet arrest of dilation criteria, however has not changed in over 2 hours after changing from 6 to 9cm in under 2 hours. Recommend proceeding with urgent primary C/S. R/B/A including but not limited to bleeding possibly requiring a blood transfusion, infection, injury to surrounding structures/vessels/nerves, need for further procedures, injury to baby, risks of anesthesia, medical complications (PNA, stroke, MI, DVT/PE, death). All questions answered. Consent for surgery and transfusion if needed signed. Charge RN and anesthesia aware. Anesthesia aware of indication for C/S, however unavailable due to having a case in the main OR and needing to call in another team, 30 minutes away. Will attempt to expedite patient cares.    Kamilla Barbour MD      "

## 2023-12-02 NOTE — ANESTHESIA PROCEDURE NOTES
Epidural catheter Procedure Note    Pre-Procedure   Staff -        Anesthesiologist:  Jaquan Ojeda MD       Performed By: anesthesiologist       Location: OB       Pre-Anesthestic Checklist: patient identified, IV checked, risks and benefits discussed, informed consent, monitors and equipment checked, pre-op evaluation and at physician/surgeon's request  Timeout:       Correct Patient: Yes        Correct Procedure: Yes        Correct Site: Yes        Correct Position: Yes   Procedure Documentation  Procedure: epidural catheter       Patient Position: sitting       Patient Prep/Sterile Barriers: sterile gloves, mask, patient draped       Skin prep: Betadine       Local skin infiltrated with 3 mL of 1% lidocaine.        Insertion Site: L3-4. (midline approach).       Technique: LORT saline        SHARDA at 9 cm.       Needle Type: Touhy needle       Needle Gauge: 17.        Needle Length (Inches): 3.5        Catheter: 19 G.          Catheter threaded easily.           Threaded 14 cm at skin.         # of attempts: 2 and  # of redirects:     Assessment/Narrative         Paresthesias: No.       Test dose of 3 mL lidocaine 1.5% w/ 1:200,000 epinephrine at 09:44 CST.         Test dose negative, 3 minutes after injection, for signs of intravascular, subdural, or intrathecal injection.       Insertion/Infusion Method: LORT saline       Aspiration negative for Heme or CSF via Epidural Catheter.    Medication(s) Administered   0.2% Ropivacaine (Epidural) - EPIDURAL   10 mL - 12/2/2023 9:46:00 AM   Comments:  No complications   Secured with Tegaderm, adhesive spray and tape    Orders to manage the epidural infusion have been entered and, through coordination with the nurse, we will continue to manage and monitor the patient's labor epidural.  We will continuously be available to adjust as needed throughout the entire labor and delivery process.        FOR [facility] ([facility]) ONLY:   Pain Team Contact information:  "please page the Pain Team Via [facility]. Search \"Pain\". During daytime hours, please page the attending first. At night please page the resident first.      "

## 2023-12-02 NOTE — PROGRESS NOTES
Noted that contractions not tracing well and thus went to patient's room where RN was at the bedside. Discussed option of placement of IUPC as well as FSE, and both RN and patient in agreement.     SVE 6/80/-1, asynclitic, some caput    FHT category indeterminate given inability to fully classify decelerations.     Following closely.    Kamilla Barbour MD

## 2023-12-02 NOTE — PROGRESS NOTES
"Assumed care of patient after receiving sign out from Dr. Rose. In summary, Janet is a 18 year old  @ 39w0d who presented for IOL yesterday for newly diagnosed oligohydramnios. 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 She was diagnosed with preeclampsia with severe features on admission and was started on magnesium for seizure ppx.    Cervidil placed on arrival after negative CST. Cervidil removed at 0230 after a prolonged deceleration. Variable decels @ 0300. Category II FHT improved and transitioned to Category I after IVF and terbutaline x 1.     Very uncomfortable with contractions this morning. Desires epidural, but would also like to order and eat breakfast. Encouraged bland diet.     O: /65   Temp 97.5  F (36.4  C) (Temporal)   Resp 18   Ht 1.6 m (5' 3\")   Wt 124.2 kg (273 lb 12.8 oz)   LMP 2023   SpO2 96%   Breastfeeding No   BMI 48.50 kg/m    UOP: 50cc/h (400ml/8h    FHT: baseline 130 with moderate variability. +accels. Episodic variable decels.   [place]: q1-3minutes  SVE: deferred    A/P: IOL after negative CST for oligohydramnios @ 39w0d, now with preeclampsia with severe features on magnesium. No s/s magnesium toxicity. Bps normal to low mild range. 0500 HELLP labs negative. S/p cervidil with tachysystole, resolved with terbutaline. FHT Cat II given variables, however overall reassuring with moderate variability and accels, and thus no concern for interruption in the fetal oxygenation pathway. GBS+ on PCN. Will plan for epidural now. Hopeful for .    Kamilla Barbour MD    "

## 2023-12-02 NOTE — OP NOTE
[facility]  Obstetrics Operative Report    Surgery Date:  2023  Surgeon(s): Kamilla Barbour MD    Preoperative Diagnoses:  Intrauterine pregnancy at 39w0d  Category II FHT  Class III obesity    Postoperative diagnoses: Same, s/p procedure below    Procedure performed:  Primary low segment transverse  section with single layer uterine closure    Anesthesia:  Epidural dosed for surgery    Est Blood Loss (mL): 715 mL  Fluid replacement: per anesthesia  Urine output: clear, per anesthesia  Specimens: Placenta, cord gases  Complications: None apparent    Operative findings:   1. Single viable female infant at 1700 hours on 23. Apgars of 5 and 9 at one and five minutes.  Birth weight:: 3300g.  Fetal presentation: OP, asynclitic, considerable caput. Amniotic fluid: clear. Placenta intact with 3 vessel cord.    2.Normal appearing uterus, fallopian tubes, ovaries.    3. No intraabdominal adhesions.  No abdominal wall adhesions    Indication: Janet Rhodes is a 18 year old, , who was admitted at 38w6d for IOL for oligohydramnios. She had a negative CST and thus cervical ripening was begun. She then ruled in for preeclampsia with severe features and thus magnesium sulfate was started and she was transferred from the CNM service to the MD service. On HD#2, she underwent SROM, received an epidural for pain control and then developed a Category II FHT, with inadequate contractions. The FHT did have a short period of time in which it was Category I, and thus pitocin was started. Thereafter, a Category II FHT returned. As she was still 9cm with an unreducible lip of cervix, and no further augmentation was possible, a  section was recommended. The risks, benefits, and alternatives of  delivery were explained and the patient agreed to proceed.     Disposition/Condition: stable to the recovery room    Procedure details:  After obtaining informed consent the patient was taken  to the operating room where SCD's were placed. Her epidural was dosed for surgery without difficulty. She was then transitioned to the supine position with a leftward table tilt. FHTs were obtained and noted to be normal. A rico catheter was already in place. She was then prepped and draped in the sterile fashion for a  section. A procedural time out was performed. Nursing and NICU staff were present and available for baby.     The abdomen was entered through a Pfannenstiel skin incision and carried through the subcutaneous tissue to the fascia. The fascia was incised in the midline and extended bilaterally with the Meadows scissors.  The superior margin of the fascial incision was grasped with Kocher clamps and dissected from the underlying muscle using blunt dissection. This was then repeated at the lower margin of the fascial incision.  The muscle was  in the midline.  The peritoneum was entered bluntly and then stretched laterally and superiorly for optimal visualization and operation. The large self retaining retractor was then placed. Two laps were placed to pack the bowel into the upper abdomen on the patient's right. The lower segment of the uterus was opened sharply in a transverse fashion and the hysterotomy was carried down and through until the uterus could be entered with digital pressure. The hysterotomy was then stretched with cephalocaudad digital pressure. The infant's head was elevated to the level of the hysterotomy and was delivered atraumatically. The infant's body followed thereafter with fundal pressure. The cord was immediately clamped and cut and the infant was handed off to the waiting nursing staff. A segment of the cord was clamped x 2, cut and set aside for cord gases. Cord blood was obtained if needed.     The placenta was extracted with fundal massage and cord traction.  The uterus was cleared of all clots and debris. With vigorous massage as well as administration of  oxytocin, good uterine tone was achieved. The hysterotomy was repaired with 0-vicryl suture in a running locked fashion. Due to the patient's body habitus and labored lower uterine segment, the uterus was exteriorized for optimal visualization. Two areas of oozing along the hysterotomy were noted and figures of X were placed using 0 Vicryl and good hemostasis was achieved. The posterior cul de sac and pericolic gutters were cleared of all clots and debris. The uterus was returned to the abdomen.  The bilateral pericolic gutters were cleared of all remaining clots and debris.  The hysterotomy was again inspected and found to have no active sites of bleeding. The previously packed lap sponges were then removed from the abdomen. A lap count was then performed and noted to be correct. The self retaining retractor was then removed from the abdomen. The undersides of the fascia and rectus muscle bellies were then inspected and all areas of oozing were made hemostatic with the use of electrocautery.    The fascia was closed with a running suture of 0-vicryl. The subcutaneous tissue was irrigated, dried and methodically inspected and all areas of oozing were made hemostatic with the use of electrocautery. The subcutaneous tissue was then closed in a simple running fashion using 3-0 plain gut suture. The skin was closed with 4-0 vicryl and a pressure bandage was placed.    The patient tolerated the procedure well and was taken to the recovery room in stable condition. All sponge, needle and instrument counts were correct x2.     The procedure time and difficulty was extended by greater than 50% due to the patients body habitus as well as the fetal occiput and position.    Kamilla Barbour MD  [facility]  12/2/2023 5:55 PM

## 2023-12-02 NOTE — PROGRESS NOTES
[facility]  Labor Progress Note    S: no headache, vision changes     O:   Patient Vitals for the past 4 hrs:   BP Temp Temp src Resp   23 0438 (!) 182/93 -- -- --   23 0426 (!) 183/81 -- -- --   23 0057 136/72 98  F (36.7  C) Temporal 18     SVE: Last check 1-2 cm at 0230     FHT: Baseline 150-160, moderate variability, + accelerations, current with one late decelerations  [place]: Contractions Q 2-3 min     A/P: 18 year old  at 39w0d admitted for IOL for oligo, new onset of preeclampsia. Had normal CST on admission prior to starting IOL     Labor: s/p cervadil, removed for prolonged decel at 0230. Since then, had some variable decels around 3 am.   FWB: Category 2 FHT, improvement in tracing after terb/fluid bolus. Now intermittently cat 2. Plan to watch for 30 min, if cat 1, trial of pitocin.   GBS: negative  Rh: Positive  Preeclampsia- mild pressures on admission, now with treatable severe pressures. Will treat with mag, asx. RPT labs now. Plan to start magnesium. HTN order set placed.     Continue to follow closely     MD [facility]  2023, 4:48 AM

## 2023-12-02 NOTE — PROGRESS NOTES
"In to see patient, comfortable, sleeping. Reports feeling like she has to urinate and cannot drain her bladder. No rectal pressure. Amenable to an SVE.    Denies SOB, HA, vision changes, RUQ pain.    O: /60   Temp 97.6  F (36.4  C) (Temporal)   Resp 18   Ht 1.6 m (5' 3\")   Wt 124.2 kg (273 lb 12.8 oz)   LMP 2023   SpO2 97%   Breastfeeding No   BMI 48.50 kg/m    UOP: 700ml in catheter bag, clear and dilute    FHT: baseline 135 with moderate variability. +accels. Early and late decels.   [place]: q5-6 minutes  SVE: 9/90/0    A/P: Oligohydramnios and preeclampsia with severe features, progressing expectantly s/p cervidil (out @ 0230) and SROM. Intermittent Category II FHT, however changed from 6 to 9cm in less than 2h. Thus, will not start pitocin for augmentation unless FHT is Cat I. If remains Category II, with inadequate contractions and does not make further change within 1-2 hours, would then recommend C/S. Hopeful to continue making change given prior noted. Continue PCN for GBS ppx. Hopeful for .    Kamilla Barbour MD    "

## 2023-12-02 NOTE — PROGRESS NOTES
"Back in to see patient, sleeping. Upon waking, feeling more pressure.     O: /70   Temp 98.8  F (37.1  C) (Temporal)   Resp 18   Ht 1.6 m (5' 3\")   Wt 124.2 kg (273 lb 12.8 oz)   LMP 03/04/2023   SpO2 96%   Breastfeeding No   BMI 48.50 kg/m      FHT: baseline 130 with moderate variability. +accels. Early and late decels. No late decels in the past 30 minutes.   [place]: q5-6 minutes  SVE: 9/90/0, cervix reducible, however returns upon stopping pushing    A/P: Oligohydramnios and preeclampsia with severe features on magnesium @ 39w0d     Discussed starting Pitocin given Category I FHT for past 30 minutes for augmentation. Will plan to recheck in 1 hour. If not complete, or if FHT returns to a Category II FHT and not complete, and need to discontinue pitocin, would then need to proceed with delivery via C/S, discussed with the patient, who expressed understanding. Will recheck at 1530.    Kamilla Barbour MD    "

## 2023-12-02 NOTE — ANESTHESIA PREPROCEDURE EVALUATION
"Anesthesia Pre-Procedure Evaluation    Patient: Janet Rhodes   MRN: [id number] : 2005        Procedure : c section          Past Medical History:   Diagnosis Date    Depressive disorder       History reviewed. No pertinent surgical history.   No Known Allergies   Social History     Tobacco Use    Smoking status: Never    Smokeless tobacco: Never   Substance Use Topics    Alcohol use: Not Currently      Wt Readings from Last 1 Encounters:   23 124.2 kg (273 lb 12.8 oz)        Anesthesia Evaluation            ROS/MED HX  ENT/Pulmonary:    (-) asthma   Neurologic:  - neg neurologic ROS     Cardiovascular:     (+)  - - PIH  -  - -                                      METS/Exercise Tolerance:     Hematologic:     (+)  no anticoagulation therapy, no coagulopathy,             Musculoskeletal:       GI/Hepatic:     (+) GERD,                   Renal/Genitourinary:       Endo:       Psychiatric/Substance Use:       Infectious Disease:       Malignancy:       Other:            Physical Exam    Airway        Mallampati: II   TM distance: > 3 FB   Neck ROM: full     Respiratory Devices and Support         Dental  no notable dental history         Cardiovascular   cardiovascular exam normal          Pulmonary   pulmonary exam normal                OUTSIDE LABS:  CBC:   Lab Results   Component Value Date    WBC 13.1 (H) 2023    WBC 10.8 2023    HGB 11.3 (L) 2023    HGB 12.0 2023    HCT 33.2 (L) 2023    HCT 36.0 2023     2023     2023     BMP:   Lab Results   Component Value Date    CR 0.49 (L) 2023    CR 0.53 2023     COAGS: No results found for: \"PTT\", \"INR\", \"FIBR\"  POC: No results found for: \"BGM\", \"HCG\", \"HCGS\"  HEPATIC:   Lab Results   Component Value Date    ALT 13 2023    AST 13 2023     OTHER:   Lab Results   Component Value Date    A1C 5.1 2023       Anesthesia Plan    ASA Status:  2       Anesthesia Type: " Epidural.              Consents    Anesthesia Plan(s) and associated risks, benefits, and realistic alternatives discussed. Questions answered and patient/representative(s) expressed understanding.     - Discussed:     - Discussed with:  Patient            Postoperative Care    Pain management: intrathecal morphine.        Comments:    Other Comments: Orders to manage the epidural infusion have been entered, and through coordination with the nurse, we will continute to manage and monitor the patient's labor epidural.  We will continuously be available to adjust as needed thruout the entire L&D process.            Jaquan Ojeda MD    I have reviewed the pertinent notes and labs in the chart from the past 30 days and (re)examined the patient.  Any updates or changes from those notes are reflected in this note.

## 2023-12-03 LAB
ALBUMIN SERPL BCG-MCNC: 2.4 G/DL (ref 3.5–5.2)
ALBUMIN SERPL BCG-MCNC: 2.6 G/DL (ref 3.5–5.2)
ALBUMIN SERPL BCG-MCNC: 2.8 G/DL (ref 3.5–5.2)
ALBUMIN SERPL BCG-MCNC: 3 G/DL (ref 3.5–5.2)
ALP SERPL-CCNC: 106 U/L (ref 40–150)
ALP SERPL-CCNC: 107 U/L (ref 40–150)
ALP SERPL-CCNC: 110 U/L (ref 40–150)
ALP SERPL-CCNC: 116 U/L (ref 40–150)
ALT SERPL W P-5'-P-CCNC: 10 U/L (ref 0–50)
ALT SERPL W P-5'-P-CCNC: 11 U/L (ref 0–50)
ALT SERPL W P-5'-P-CCNC: 12 U/L (ref 0–50)
ALT SERPL W P-5'-P-CCNC: 12 U/L (ref 0–50)
ANION GAP SERPL CALCULATED.3IONS-SCNC: 8 MMOL/L (ref 7–15)
ANION GAP SERPL CALCULATED.3IONS-SCNC: 8 MMOL/L (ref 7–15)
ANION GAP SERPL CALCULATED.3IONS-SCNC: 9 MMOL/L (ref 7–15)
ANION GAP SERPL CALCULATED.3IONS-SCNC: 9 MMOL/L (ref 7–15)
AST SERPL W P-5'-P-CCNC: 19 U/L (ref 0–45)
AST SERPL W P-5'-P-CCNC: 22 U/L (ref 0–45)
AST SERPL W P-5'-P-CCNC: 23 U/L (ref 0–45)
AST SERPL W P-5'-P-CCNC: 25 U/L (ref 0–45)
BILIRUB SERPL-MCNC: 0.2 MG/DL
BILIRUB SERPL-MCNC: 0.2 MG/DL
BILIRUB SERPL-MCNC: <0.2 MG/DL
BILIRUB SERPL-MCNC: <0.2 MG/DL
BUN SERPL-MCNC: 5.8 MG/DL (ref 6–20)
BUN SERPL-MCNC: 6 MG/DL (ref 6–20)
BUN SERPL-MCNC: 6.2 MG/DL (ref 6–20)
BUN SERPL-MCNC: 8.6 MG/DL (ref 6–20)
CALCIUM SERPL-MCNC: 7.4 MG/DL (ref 8.6–10)
CALCIUM SERPL-MCNC: 7.5 MG/DL (ref 8.6–10)
CALCIUM SERPL-MCNC: 7.9 MG/DL (ref 8.6–10)
CALCIUM SERPL-MCNC: 7.9 MG/DL (ref 8.6–10)
CHLORIDE SERPL-SCNC: 105 MMOL/L (ref 98–107)
CHLORIDE SERPL-SCNC: 105 MMOL/L (ref 98–107)
CHLORIDE SERPL-SCNC: 106 MMOL/L (ref 98–107)
CHLORIDE SERPL-SCNC: 107 MMOL/L (ref 98–107)
CREAT SERPL-MCNC: 0.51 MG/DL (ref 0.51–0.95)
CREAT SERPL-MCNC: 0.55 MG/DL (ref 0.51–0.95)
CREAT SERPL-MCNC: 0.56 MG/DL (ref 0.51–0.95)
CREAT SERPL-MCNC: 0.64 MG/DL (ref 0.51–0.95)
DEPRECATED HCO3 PLAS-SCNC: 21 MMOL/L (ref 22–29)
DEPRECATED HCO3 PLAS-SCNC: 22 MMOL/L (ref 22–29)
DEPRECATED HCO3 PLAS-SCNC: 23 MMOL/L (ref 22–29)
DEPRECATED HCO3 PLAS-SCNC: 25 MMOL/L (ref 22–29)
EGFRCR SERPLBLD CKD-EPI 2021: >90 ML/MIN/1.73M2
ERYTHROCYTE [DISTWIDTH] IN BLOOD BY AUTOMATED COUNT: 14.2 % (ref 10–15)
ERYTHROCYTE [DISTWIDTH] IN BLOOD BY AUTOMATED COUNT: 14.5 % (ref 10–15)
ERYTHROCYTE [DISTWIDTH] IN BLOOD BY AUTOMATED COUNT: 14.6 % (ref 10–15)
ERYTHROCYTE [DISTWIDTH] IN BLOOD BY AUTOMATED COUNT: 14.6 % (ref 10–15)
GLUCOSE SERPL-MCNC: 109 MG/DL (ref 70–99)
GLUCOSE SERPL-MCNC: 134 MG/DL (ref 70–99)
GLUCOSE SERPL-MCNC: 80 MG/DL (ref 70–99)
GLUCOSE SERPL-MCNC: 85 MG/DL (ref 70–99)
HCT VFR BLD AUTO: 28.4 % (ref 35–47)
HCT VFR BLD AUTO: 29.7 % (ref 35–47)
HCT VFR BLD AUTO: 30.6 % (ref 35–47)
HCT VFR BLD AUTO: 32.3 % (ref 35–47)
HGB BLD-MCNC: 10.4 G/DL (ref 11.7–15.7)
HGB BLD-MCNC: 9.3 G/DL (ref 11.7–15.7)
HGB BLD-MCNC: 9.5 G/DL (ref 11.7–15.7)
HGB BLD-MCNC: 9.9 G/DL (ref 11.7–15.7)
MAGNESIUM SERPL-MCNC: 3.3 MG/DL (ref 1.7–2.3)
MAGNESIUM SERPL-MCNC: 4.5 MG/DL (ref 1.7–2.3)
MAGNESIUM SERPL-MCNC: 4.7 MG/DL (ref 1.7–2.3)
MAGNESIUM SERPL-MCNC: 4.7 MG/DL (ref 1.7–2.3)
MCH RBC QN AUTO: 30 PG (ref 26.5–33)
MCH RBC QN AUTO: 30.1 PG (ref 26.5–33)
MCH RBC QN AUTO: 30.3 PG (ref 26.5–33)
MCH RBC QN AUTO: 30.6 PG (ref 26.5–33)
MCHC RBC AUTO-ENTMCNC: 32 G/DL (ref 31.5–36.5)
MCHC RBC AUTO-ENTMCNC: 32.2 G/DL (ref 31.5–36.5)
MCHC RBC AUTO-ENTMCNC: 32.4 G/DL (ref 31.5–36.5)
MCHC RBC AUTO-ENTMCNC: 32.7 G/DL (ref 31.5–36.5)
MCV RBC AUTO: 93 FL (ref 78–100)
MCV RBC AUTO: 93 FL (ref 78–100)
MCV RBC AUTO: 94 FL (ref 78–100)
MCV RBC AUTO: 94 FL (ref 78–100)
PLATELET # BLD AUTO: 196 10E3/UL (ref 150–450)
PLATELET # BLD AUTO: 199 10E3/UL (ref 150–450)
PLATELET # BLD AUTO: 220 10E3/UL (ref 150–450)
PLATELET # BLD AUTO: 222 10E3/UL (ref 150–450)
POTASSIUM SERPL-SCNC: 3.7 MMOL/L (ref 3.4–5.3)
POTASSIUM SERPL-SCNC: 4.1 MMOL/L (ref 3.4–5.3)
POTASSIUM SERPL-SCNC: 4.1 MMOL/L (ref 3.4–5.3)
POTASSIUM SERPL-SCNC: 4.2 MMOL/L (ref 3.4–5.3)
PROT SERPL-MCNC: 4.7 G/DL (ref 6.4–8.3)
PROT SERPL-MCNC: 5 G/DL (ref 6.4–8.3)
PROT SERPL-MCNC: 5.7 G/DL (ref 6.4–8.3)
PROT SERPL-MCNC: 5.8 G/DL (ref 6.4–8.3)
RBC # BLD AUTO: 3.07 10E6/UL (ref 3.8–5.2)
RBC # BLD AUTO: 3.17 10E6/UL (ref 3.8–5.2)
RBC # BLD AUTO: 3.24 10E6/UL (ref 3.8–5.2)
RBC # BLD AUTO: 3.46 10E6/UL (ref 3.8–5.2)
SODIUM SERPL-SCNC: 135 MMOL/L (ref 135–145)
SODIUM SERPL-SCNC: 137 MMOL/L (ref 135–145)
SODIUM SERPL-SCNC: 138 MMOL/L (ref 135–145)
SODIUM SERPL-SCNC: 138 MMOL/L (ref 135–145)
WBC # BLD AUTO: 10.6 10E3/UL (ref 4–11)
WBC # BLD AUTO: 11.1 10E3/UL (ref 4–11)
WBC # BLD AUTO: 11.1 10E3/UL (ref 4–11)
WBC # BLD AUTO: 12.8 10E3/UL (ref 4–11)

## 2023-12-03 PROCEDURE — 258N000003 HC RX IP 258 OP 636: Performed by: OBSTETRICS & GYNECOLOGY

## 2023-12-03 PROCEDURE — 250N000013 HC RX MED GY IP 250 OP 250 PS 637: Performed by: OBSTETRICS & GYNECOLOGY

## 2023-12-03 PROCEDURE — 83735 ASSAY OF MAGNESIUM: CPT | Performed by: OBSTETRICS & GYNECOLOGY

## 2023-12-03 PROCEDURE — 120N000012 HC R&B POSTPARTUM

## 2023-12-03 PROCEDURE — 85027 COMPLETE CBC AUTOMATED: CPT | Performed by: OBSTETRICS & GYNECOLOGY

## 2023-12-03 PROCEDURE — 84155 ASSAY OF PROTEIN SERUM: CPT | Performed by: OBSTETRICS & GYNECOLOGY

## 2023-12-03 PROCEDURE — 999N000128 HC STATISTIC PERIPHERAL IV START W/O US GUIDANCE

## 2023-12-03 PROCEDURE — 36415 COLL VENOUS BLD VENIPUNCTURE: CPT | Performed by: OBSTETRICS & GYNECOLOGY

## 2023-12-03 PROCEDURE — 250N000011 HC RX IP 250 OP 636: Mod: JZ | Performed by: OBSTETRICS & GYNECOLOGY

## 2023-12-03 PROCEDURE — 84450 TRANSFERASE (AST) (SGOT): CPT | Performed by: OBSTETRICS & GYNECOLOGY

## 2023-12-03 PROCEDURE — 80053 COMPREHEN METABOLIC PANEL: CPT | Performed by: OBSTETRICS & GYNECOLOGY

## 2023-12-03 PROCEDURE — 250N000011 HC RX IP 250 OP 636: Performed by: OBSTETRICS & GYNECOLOGY

## 2023-12-03 RX ADMIN — SODIUM CHLORIDE, POTASSIUM CHLORIDE, SODIUM LACTATE AND CALCIUM CHLORIDE 75 ML/HR: 600; 310; 30; 20 INJECTION, SOLUTION INTRAVENOUS at 04:30

## 2023-12-03 RX ADMIN — OXYCODONE HYDROCHLORIDE 5 MG: 5 TABLET ORAL at 09:41

## 2023-12-03 RX ADMIN — Medication 1 TABLET: at 12:06

## 2023-12-03 RX ADMIN — ENOXAPARIN SODIUM 60 MG: 60 INJECTION SUBCUTANEOUS at 22:34

## 2023-12-03 RX ADMIN — MAGNESIUM SULFATE HEPTAHYDRATE 2 G/HR: 40 INJECTION, SOLUTION INTRAVENOUS at 01:50

## 2023-12-03 RX ADMIN — DOCUSATE SODIUM 50 MG AND SENNOSIDES 8.6 MG 2 TABLET: 8.6; 5 TABLET, FILM COATED ORAL at 21:06

## 2023-12-03 RX ADMIN — OXYCODONE HYDROCHLORIDE 5 MG: 5 TABLET ORAL at 22:34

## 2023-12-03 RX ADMIN — IBUPROFEN 800 MG: 400 TABLET ORAL at 21:06

## 2023-12-03 RX ADMIN — OXYCODONE HYDROCHLORIDE 5 MG: 5 TABLET ORAL at 17:48

## 2023-12-03 RX ADMIN — DOCUSATE SODIUM 50 MG AND SENNOSIDES 8.6 MG 1 TABLET: 8.6; 5 TABLET, FILM COATED ORAL at 07:57

## 2023-12-03 RX ADMIN — KETOROLAC TROMETHAMINE 30 MG: 30 INJECTION, SOLUTION INTRAMUSCULAR; INTRAVENOUS at 01:37

## 2023-12-03 RX ADMIN — KETOROLAC TROMETHAMINE 30 MG: 30 INJECTION, SOLUTION INTRAMUSCULAR; INTRAVENOUS at 07:56

## 2023-12-03 RX ADMIN — ACETAMINOPHEN 975 MG: 325 TABLET, FILM COATED ORAL at 10:41

## 2023-12-03 RX ADMIN — KETOROLAC TROMETHAMINE 30 MG: 30 INJECTION, SOLUTION INTRAMUSCULAR; INTRAVENOUS at 14:00

## 2023-12-03 RX ADMIN — MAGNESIUM SULFATE HEPTAHYDRATE 2 G/HR: 40 INJECTION, SOLUTION INTRAVENOUS at 12:47

## 2023-12-03 RX ADMIN — ACETAMINOPHEN 975 MG: 325 TABLET, FILM COATED ORAL at 04:33

## 2023-12-03 RX ADMIN — ENOXAPARIN SODIUM 60 MG: 60 INJECTION SUBCUTANEOUS at 10:42

## 2023-12-03 RX ADMIN — ACETAMINOPHEN 975 MG: 325 TABLET, FILM COATED ORAL at 17:45

## 2023-12-03 ASSESSMENT — ACTIVITIES OF DAILY LIVING (ADL)
ADLS_ACUITY_SCORE: 24

## 2023-12-03 NOTE — PROGRESS NOTES
Post-partum Note      S: Patient is doing well today.  Pain is controlled with PO medications.  Tolerating regular diet without nausea or vomiting.  Has not ambulated, has stood up without dizziness. Chaves in place. Lochia normal.  Formula feeding, does not plan to breastfeed. No headache, vision change, chest pain/sob. Had some oozing from incision last night, had dressing reinforced. Family here with her     O:  Patient Vitals for the past 24 hrs:   BP Temp Temp src Pulse Resp SpO2   12/03/23 1032 116/56 -- -- 63 18 99 %   12/03/23 0934 100/41 -- -- 61 16 98 %   12/03/23 0834 115/52 -- -- 59 18 97 %   12/03/23 0730 (!) 143/68 98.5  F (36.9  C) Oral 61 16 99 %   12/03/23 0646 134/70 -- -- 61 20 99 %   12/03/23 0527 136/66 97.4  F (36.3  C) Oral 61 18 99 %   12/03/23 0428 130/64 -- -- 63 20 100 %   12/03/23 0329 135/58 -- -- 70 20 99 %   12/03/23 0229 133/59 -- -- 68 18 99 %   12/03/23 0134 115/65 -- -- 71 16 99 %   12/03/23 0027 117/58 -- Oral 66 16 98 %   12/02/23 2330 124/60 -- -- 65 16 99 %   12/02/23 2233 135/71 -- -- 68 20 99 %   12/02/23 2139 123/60 -- -- 70 20 100 %   12/02/23 2101 123/66 -- -- 65 22 98 %   12/02/23 2034 130/71 97.9  F (36.6  C) Oral 61 20 98 %   12/02/23 2000 125/71 -- -- 75 14 97 %   12/02/23 1945 114/73 -- -- 78 (!) 34 96 %   12/02/23 1936 -- -- -- 76 -- --   12/02/23 1932 -- -- -- 81 21 96 %   12/02/23 1931 -- -- -- 73 11 97 %   12/02/23 1930 117/73 -- -- 73 14 97 %   12/02/23 1929 -- -- -- 71 28 97 %   12/02/23 1928 -- -- -- 78 (!) 101 97 %   12/02/23 1927 -- -- -- 76 15 97 %   12/02/23 1926 -- -- -- 78 (!) 40 97 %   12/02/23 1925 -- -- -- 80 19 96 %   12/02/23 1924 -- -- -- 82 (!) 53 98 %   12/02/23 1923 -- -- -- 73 23 98 %   12/02/23 1922 -- -- -- 80 29 98 %   12/02/23 1921 -- -- -- 77 (!) 35 98 %   12/02/23 1920 -- -- -- 73 27 97 %   12/02/23 1919 -- -- -- 76 28 97 %   12/02/23 1918 -- -- -- 73 (!) 44 97 %   12/02/23 1917 -- -- -- 75 19 97 %   12/02/23 1916 -- -- -- 73 14 96 %    12/02/23 1915 127/70 -- -- 80 24 97 %   12/02/23 1914 -- -- -- 81 17 97 %   12/02/23 1913 -- -- -- 84 (!) 36 96 %   12/02/23 1912 -- -- -- 78 (!) 39 96 %   12/02/23 1911 -- -- -- 81 (!) 58 98 %   12/02/23 1910 -- -- -- 95 28 97 %   12/02/23 1909 -- -- -- 78 13 98 %   12/02/23 1908 -- -- -- 78 21 98 %   12/02/23 1907 -- -- -- 79 (!) 58 97 %   12/02/23 1906 -- -- -- 80 (!) 32 97 %   12/02/23 1905 -- -- -- 81 (!) 31 98 %   12/02/23 1904 -- -- -- 82 15 98 %   12/02/23 1903 -- -- -- 88 (!) 33 97 %   12/02/23 1902 -- -- -- 84 (!) 31 99 %   12/02/23 1901 -- -- -- 85 (!) 34 98 %   12/02/23 1900 137/67 -- -- 81 16 97 %   12/02/23 1859 -- -- -- 75 (!) 86 98 %   12/02/23 1858 -- -- -- 81 20 98 %   12/02/23 1857 -- -- -- 80 14 98 %   12/02/23 1856 -- -- -- 79 (!) 71 98 %   12/02/23 1855 -- -- -- 74 (!) 0 97 %   12/02/23 1854 -- -- -- 81 23 97 %   12/02/23 1853 -- -- -- 86 (!) 53 97 %   12/02/23 1852 -- -- -- 78 26 96 %   12/02/23 1851 -- -- -- 85 10 96 %   12/02/23 1850 -- -- -- 75 (!) 45 97 %   12/02/23 1849 -- -- -- 79 (!) 66 97 %   12/02/23 1848 -- -- -- 78 15 97 %   12/02/23 1847 -- -- -- 79 (!) 33 97 %   12/02/23 1846 -- -- -- 76 22 98 %   12/02/23 1845 107/57 -- -- 69 12 99 %   12/02/23 1844 -- -- -- 77 (!) 90 98 %   12/02/23 1843 -- -- -- 80 21 97 %   12/02/23 1842 -- -- -- 85 21 97 %   12/02/23 1841 -- -- -- 87 (!) 37 97 %   12/02/23 1840 -- -- -- 81 20 98 %   12/02/23 1839 -- -- -- 85 (!) 38 97 %   12/02/23 1838 -- -- -- 80 29 96 %   12/02/23 1837 -- -- -- 78 21 98 %   12/02/23 1836 -- -- -- 82 12 97 %   12/02/23 1835 -- -- -- 80 25 96 %   12/02/23 1834 -- -- -- 84 12 97 %   12/02/23 1833 -- -- -- 81 (!) 32 96 %   12/02/23 1832 -- -- -- 81 13 96 %   12/02/23 1831 -- -- -- 86 15 97 %   12/02/23 1830 123/80 -- -- 72 12 98 %   12/02/23 1829 -- -- -- 83 29 96 %   12/02/23 1828 -- -- -- 92 18 96 %   12/02/23 1827 -- -- -- 83 (!) 35 97 %   12/02/23 1826 -- -- -- 86 29 96 %   12/02/23 1825 -- -- -- 84 10 96 %   12/02/23  1824 -- -- -- 89 (!) 32 96 %   12/02/23 1823 -- -- -- 87 17 97 %   12/02/23 1822 -- -- -- 96 (!) 83 96 %   12/02/23 1821 -- -- -- 97 13 97 %   12/02/23 1820 -- -- -- 88 19 97 %   12/02/23 1819 -- -- -- 89 28 96 %   12/02/23 1818 -- -- -- 87 (!) 47 96 %   12/02/23 1817 -- -- -- 96 (!) 0 97 %   12/02/23 1816 -- -- -- 92 24 97 %   12/02/23 1815 126/59 -- -- 93 17 95 %   12/02/23 1800 (!) 147/69 98.5  F (36.9  C) Axillary 98 20 97 %   12/02/23 1757 128/66 -- -- -- -- --   12/02/23 1615 137/77 -- -- -- -- 96 %   12/02/23 1600 137/76 98.8  F (37.1  C) Temporal -- -- 97 %   12/02/23 1545 138/83 -- -- -- -- 96 %   12/02/23 1530 132/71 -- -- -- -- 97 %   12/02/23 1515 127/71 -- -- -- -- 96 %   12/02/23 1500 133/70 98.8  F (37.1  C) Temporal -- -- 96 %   12/02/23 1445 132/69 -- -- -- -- 96 %   12/02/23 1430 129/70 -- -- -- -- 96 %   12/02/23 1415 130/70 -- -- -- -- 96 %   12/02/23 1400 136/75 -- -- -- -- 96 %   12/02/23 1345 139/72 98.8  F (37.1  C) Temporal -- -- 96 %   12/02/23 1330 128/73 -- -- -- -- 96 %   12/02/23 1315 130/60 -- -- -- -- 97 %   12/02/23 1300 125/60 97.6  F (36.4  C) Temporal -- -- 97 %   12/02/23 1245 133/63 -- -- -- -- 97 %   12/02/23 1230 130/62 -- -- -- -- 96 %   12/02/23 1215 127/61 -- -- -- -- 97 %   12/02/23 1200 134/62 97.8  F (36.6  C) Temporal -- -- 96 %   12/02/23 1145 134/62 -- -- -- -- 97 %       Gen:  Resting comfortably, NAD  Pulm:  Breathing comfortably on room air  Abd:  Soft, appropriately ttp, non-distended.Fundus at umbilicus, firm and non-tender.  Incision: intact with dressing with serosanguinous drainage, not seeping out around bandage. Pressure dressing removed   Ext:  non-tender, 2+ bilateral LE edema, +2 reflexes, no clonus.     I/O last 3 completed shifts:  In: 5631 [P.O.:1000; I.V.:4631]  Out: 3515 [Urine:2800; Blood:715]    Hgb:     Hemoglobin   Date Value Ref Range Status   12/03/2023 9.5 (L) 11.7 - 15.7 g/dL Final   12/03/2023 9.3 (L) 11.7 - 15.7 g/dL Final        Assessment/Plan:  18 year old  on POD1 after pLTCS for category 2 after IOL for oligohydramnios at 39w0d.   Pregnancy complications:  - Oligohydramnios (WALTER 3.4), diagnosed today (2023) in clinic   - Chlamydia infection in pregnancy: + CT at [facility], patient treated with Azithromycin, but partner didn't complete treatment. BYRON neg 23.   - Mixed anxiety/depression: Started on Zoloft 50 mg at [facility], not on now.    - Congenital absence of nasal bone - nasal bone not seen on 21 wk MFM anatomy scan, NIPT negative   - Fetal Growth Restriction (RESOLVED at 27 wks) EFW 29% at 34 wks.Weekly BPPs starting at 34 wks.   - Problem related to social environment: estranged from , protection order in place, trying to divorce, SW referral placed, current boyfriend is FOB   - Morbid obesity: BMI 40 at [facility]. A1C 5.1% at [facility].    - S/P Flu and Tdap vaccines   - Rubella non-immune: plan MMR PP   - GBS positive - PCN in labor       1. Continue with routine postpartum management  2. Incision is c/d/I with sutures   3. EBL: 715ml ; pre hemoglobin 11.3, post hemogobin 9.3, no symptoms of anemia.  acute blood loss anemia. On PO iron  4. O+ , Rubella non-immune, ordered MMR   5. Feed: Breastfeeding  6. CV/RESP:   Preeclampsia with severe features   -on mag for 24 hours pp  -BP labile since delivery, occasional mild ranges, continue to monitor closely  -labs at 6 am normal. Rpt prn   7. DVT PPX: ambulation , lovenox while inpatient due to BMI/post op status.   8. Incision, pressure dressing removed, has tegaderm with soaked dressing underneath. If has additional oozing around incision, plan to remove/replace today. If stable, okay to remove tomorrow.   9. Social work ordered due to social environment (see problem list), appreciate recs.   10. A/d- previously on zoloft, no longer on.will need 2 week pp check/mood check.     Plan for inpatient monitoring, watching blood pressures, anticipate will be here until post partum  day 3 -4       MD [facility]  12/3/2023, 10:31 AM

## 2023-12-03 NOTE — ANESTHESIA CARE TRANSFER NOTE
Patient: Janet Rhodes    Procedure: Procedure(s):   section       Diagnosis: Pregnancy related condition in third trimester [O26.93]  Diagnosis Additional Information: No value filed.    Anesthesia Type:   Epidural     Note:    Oropharynx: oropharynx clear of all foreign objects  Level of Consciousness: awake  Oxygen Supplementation: face mask    Independent Airway: airway patency satisfactory and stable  Dentition: dentition unchanged  Vital Signs Stable: post-procedure vital signs reviewed and stable    Patient transferred to: PACU    Handoff Report: Identifed the Patient, Identified the Reponsible Provider, Reviewed the pertinent medical history, Discussed the surgical course, Reviewed Intra-OP anesthesia mangement and issues during anesthesia, Set expectations for post-procedure period and Allowed opportunity for questions and acknowledgement of understanding      Electronically Signed By: FREIDA Ramos CRNA  2023  6:00 PM

## 2023-12-03 NOTE — PLAN OF CARE
Goal Outcome Evaluation:       Vital signs are WNL except for a few high bp measurements this am.  BP has been in normal range since noon.  Patient is tolerating magnesium well.  She is taking Toradol and Tylenol for pain and has had one oxycodone for pain of 6 this am.  Catheter removed at 1140.  Patient moves very well and preferred to stand up.  Nurse has her rest for bp measurements q a hr.  Oxygen sats have been 96% and up.  She now rates pain a 2/10.  She has clear dressing on incision and dried drainage is visible but no new bleeding.  Doctor removed pressure dressing from overnight.  Dr. Rose said to leave dressing on until tomorrow but can replace it if fresh bleeding or other problems.  This is patient's first child and she is encouraged to ask questions.  Family here and very supportive.

## 2023-12-03 NOTE — ANESTHESIA POSTPROCEDURE EVALUATION
Patient: Janet Rhodes    Procedure: Procedure(s):   section       Anesthesia Type:  Epidural    Note:  Disposition: Admission   Postop Pain Control: Uneventful            Sign Out: Well controlled pain   PONV: No   Neuro/Psych: Uneventful            Sign Out: Acceptable/Baseline neuro status   Airway/Respiratory: Uneventful            Sign Out: Acceptable/Baseline resp. status   CV/Hemodynamics: Uneventful            Sign Out: Acceptable CV status; No obvious hypovolemia; No obvious fluid overload   Other NRE: NONE   DID A NON-ROUTINE EVENT OCCUR?     Event details/Postop Comments:  Labor epidural converted to epidural for c section           Last vitals:  Vitals:    23   BP: 130/71 123/66 123/60   Pulse: 61 65 70   Resp: 20 22 20   Temp: 36.6  C (97.9  F)     SpO2: 98% 98% 100%       Electronically Signed By: Jake Young MD  2023  9:53 PM

## 2023-12-03 NOTE — PROVIDER NOTIFICATION
12/02/23 2154   Provider Notification   Provider Name/Title MD Km   Method of Notification Electronic Page   Request Evaluate-Remote   Notification Reason Status Update     Dr. Barbour paged regarding orders for the patient. Page sent out at 2154. Call back received at 2155. Seeking to clarify if the provider wants the rico catheter removed at 6 hours per the current order. Dr. Barbour is okay with leaving the rico catheter in overnight and it can be readdressed in the am 12/3.

## 2023-12-03 NOTE — PLAN OF CARE
Goal Outcome Evaluation:      Plan of Care Reviewed With: patient, significant other    Overall Patient Progress: improvingOverall Patient Progress: improving      Patient arrived to [facility] room 431 via cart at 2030. Bands checked, fundal check verified and magnesium verified with off going RN. Bag of LR hung with the magnesium. Room orientation information, call light information, basic infant cares and paperwork covered with parents. Vital signs stable. Magnesium is running, order to discontinue 24 hours after delivery. Hourly checks completed. Clonus is absent, brachioradial reflexes +2. Lung sounds are clear. Patient is denying symptoms of pre-eclampsia at this time. Postpartum assessment WDL. Fundus is firm and midline with scant bleeding. Incision dressing intact. Dressing marked with shadow drainage. Pressure dressing applied to c/s incision site. See provider notification note. Pain controlled with tylenol and toradol. Patient plan to formula feed infant via bottle. Patient sitting at bedside, and standing/marching in place at 0527. IV found to be leaking at 0630. Dressing was replaced and IV continued to leak. Magnesium and LR stopped at this time. Float resource called and currently waiting to get a new IV placed. Float resource arrived at 0700 and assessed the patient. They advised us to call vascular access. On coming nurse who is assuming care of the patient is going to call vascular access. Patient and infant bonding well. Will continue with current plan of care. Chaves catheter in place, will be addressed when the provider rounds this morning, see provider notification note.

## 2023-12-03 NOTE — PROVIDER NOTIFICATION
12/03/23 0549   Provider Notification   Provider Name/Title Dr. Barbour   Method of Notification Electronic Page   Request Evaluate-Remote   Notification Reason Status Update     Dr. Barbour paged at 0549 and call back received at 0550. Request to evaluate remotely because the patients c/s incision dressing is saturated. When assuming care of the patient at 2030, it had under half of the dressing with a shadow drainage. Upon assessing the patient at 0530, the dressing is fully saturated. There is no leaking from the dressing at this time. RN to place a pressure dressing and the patient will be assessed today during rounds.

## 2023-12-04 ENCOUNTER — ANESTHESIA EVENT (OUTPATIENT)
Dept: OBGYN | Facility: CLINIC | Age: 18
End: 2023-12-04
Payer: COMMERCIAL

## 2023-12-04 LAB
ALBUMIN SERPL BCG-MCNC: 2.8 G/DL (ref 3.5–5.2)
ALP SERPL-CCNC: 99 U/L (ref 40–150)
ALT SERPL W P-5'-P-CCNC: 12 U/L (ref 0–50)
ANION GAP SERPL CALCULATED.3IONS-SCNC: 9 MMOL/L (ref 7–15)
AST SERPL W P-5'-P-CCNC: 25 U/L (ref 0–45)
BILIRUB SERPL-MCNC: <0.2 MG/DL
BUN SERPL-MCNC: 9.7 MG/DL (ref 6–20)
CALCIUM SERPL-MCNC: 8.6 MG/DL (ref 8.6–10)
CHLORIDE SERPL-SCNC: 105 MMOL/L (ref 98–107)
CREAT SERPL-MCNC: 0.6 MG/DL (ref 0.51–0.95)
DEPRECATED HCO3 PLAS-SCNC: 25 MMOL/L (ref 22–29)
EGFRCR SERPLBLD CKD-EPI 2021: >90 ML/MIN/1.73M2
ERYTHROCYTE [DISTWIDTH] IN BLOOD BY AUTOMATED COUNT: 14.7 % (ref 10–15)
GLUCOSE SERPL-MCNC: 101 MG/DL (ref 70–99)
HCT VFR BLD AUTO: 28.2 % (ref 35–47)
HGB BLD-MCNC: 9.2 G/DL (ref 11.7–15.7)
MAGNESIUM SERPL-MCNC: 1.9 MG/DL (ref 1.7–2.3)
MCH RBC QN AUTO: 30.7 PG (ref 26.5–33)
MCHC RBC AUTO-ENTMCNC: 32.6 G/DL (ref 31.5–36.5)
MCV RBC AUTO: 94 FL (ref 78–100)
PLATELET # BLD AUTO: 206 10E3/UL (ref 150–450)
POTASSIUM SERPL-SCNC: 4.3 MMOL/L (ref 3.4–5.3)
PROT SERPL-MCNC: 5.4 G/DL (ref 6.4–8.3)
RBC # BLD AUTO: 3 10E6/UL (ref 3.8–5.2)
SODIUM SERPL-SCNC: 139 MMOL/L (ref 135–145)
WBC # BLD AUTO: 9.3 10E3/UL (ref 4–11)

## 2023-12-04 PROCEDURE — 85027 COMPLETE CBC AUTOMATED: CPT | Performed by: OBSTETRICS & GYNECOLOGY

## 2023-12-04 PROCEDURE — 36415 COLL VENOUS BLD VENIPUNCTURE: CPT | Performed by: OBSTETRICS & GYNECOLOGY

## 2023-12-04 PROCEDURE — 83735 ASSAY OF MAGNESIUM: CPT | Performed by: OBSTETRICS & GYNECOLOGY

## 2023-12-04 PROCEDURE — 250N000013 HC RX MED GY IP 250 OP 250 PS 637: Performed by: OBSTETRICS & GYNECOLOGY

## 2023-12-04 PROCEDURE — 250N000011 HC RX IP 250 OP 636: Mod: JZ | Performed by: OBSTETRICS & GYNECOLOGY

## 2023-12-04 PROCEDURE — 120N000012 HC R&B POSTPARTUM

## 2023-12-04 PROCEDURE — 80053 COMPREHEN METABOLIC PANEL: CPT | Performed by: OBSTETRICS & GYNECOLOGY

## 2023-12-04 RX ADMIN — ACETAMINOPHEN 975 MG: 325 TABLET, FILM COATED ORAL at 19:22

## 2023-12-04 RX ADMIN — OXYCODONE HYDROCHLORIDE 5 MG: 5 TABLET ORAL at 22:48

## 2023-12-04 RX ADMIN — OXYCODONE HYDROCHLORIDE 5 MG: 5 TABLET ORAL at 17:55

## 2023-12-04 RX ADMIN — IBUPROFEN 800 MG: 400 TABLET ORAL at 10:38

## 2023-12-04 RX ADMIN — DOCUSATE SODIUM 50 MG AND SENNOSIDES 8.6 MG 1 TABLET: 8.6; 5 TABLET, FILM COATED ORAL at 08:02

## 2023-12-04 RX ADMIN — IBUPROFEN 800 MG: 400 TABLET ORAL at 17:54

## 2023-12-04 RX ADMIN — Medication 1 TABLET: at 08:02

## 2023-12-04 RX ADMIN — ENOXAPARIN SODIUM 60 MG: 60 INJECTION SUBCUTANEOUS at 10:38

## 2023-12-04 RX ADMIN — ENOXAPARIN SODIUM 60 MG: 60 INJECTION SUBCUTANEOUS at 22:49

## 2023-12-04 RX ADMIN — ACETAMINOPHEN 975 MG: 325 TABLET, FILM COATED ORAL at 06:37

## 2023-12-04 RX ADMIN — OXYCODONE HYDROCHLORIDE 5 MG: 5 TABLET ORAL at 03:53

## 2023-12-04 RX ADMIN — ACETAMINOPHEN 975 MG: 325 TABLET, FILM COATED ORAL at 00:24

## 2023-12-04 RX ADMIN — IBUPROFEN 800 MG: 400 TABLET ORAL at 22:49

## 2023-12-04 RX ADMIN — IBUPROFEN 800 MG: 400 TABLET ORAL at 03:53

## 2023-12-04 RX ADMIN — ACETAMINOPHEN 975 MG: 325 TABLET, FILM COATED ORAL at 12:52

## 2023-12-04 ASSESSMENT — ACTIVITIES OF DAILY LIVING (ADL)
ADLS_ACUITY_SCORE: 20
ADLS_ACUITY_SCORE: 24
ADLS_ACUITY_SCORE: 20

## 2023-12-04 NOTE — PLAN OF CARE
[facility]   Progress Note and Procedure Note      [facility] RECORD NUMBER:  [id number]  AGE: 66 y.o. GENDER: female  : 1949  EPISODE DATE:  2023    Subjective:     Chief Complaint   Patient presents with    Wound Check     Follow-up visit for a wound to the right lower leg. HISTORY of PRESENT ILLNESS HPI    Lucía Bradford is a 74year old female presenting today for follow-up for right lateral lower leg wound  History of Wound Context: In  of this year right lateral leg wounds had been healed patient says she tries to wear her surgical compression stocking but does not wear it all the time no pain or redness noted. She denies constitutional issues, and has been dressing wounds at home. Denies itching or pain. Wound cx +Beta Strep Group B, Proteus mirabilis, Klebsiella oxytoca; treated with Augmentin. 23:  Patient was confused as how to take care of her wounds this past week. Her wounds were too wet and caused maceration to the periwound. 23:  Patient is using the prescribed Aquacel Ag on her wound, but came in saying that she ran out of the \"blue square\" she was supposed to use. We told her that she was using what she was supposed to. Wound/Ulcer Pain Timing/Severity:  none  Quality of pain: N/A  Severity:  0 / 10 at present  Modifying Factors:  None  Associated Signs/Symptoms: edema     Ulcer Identification:  Ulcer Type: venous     Contributing Factors: edema, venous stasis, and obesity     Acute Wound: Other: venous wounds.       PAST MEDICAL HISTORY        Diagnosis Date    Asthma     Localized edema 2021    Non-pressure chronic ulcer of right lower leg with fat layer exposed ([address]) 2021    Peripheral venous insufficiency 2021    Right foot ulcer, limited to breakdown of skin ([address]) 2021    Skin ulcer of right lower leg with fat layer exposed ([address]) 2021       PAST SURGICAL HISTORY    Past Surgical Goal Outcome Evaluation:         Vital signs stable. BP 120s-140s/60s-70s. No Clonus, DTR WNL. Denies epigastric pain, SOB, visual disturbances, and headache. +2 BLE edema. Lung sounds clear equal bilaterally, dim in bases. Postpartum assessment WDL. Fundus firm, midline at U. Dressing intact to C/S, dressing marked. Pain controlled with tylenol, ibuprofen, oxycodone and ice. Patient ambulating independently. Patient passing gas. Bottle feeding baby this shift. Patient and infant bonding well. Will continue with current plan of care.

## 2023-12-04 NOTE — CONSULTS
SW: Attempted to meet with patient, who was in the bathroom when SW visited. Will follow up later today.     Addendum 1610: Spoke with pt, who indicated the main resource needed is help applying for medical assistance. Pt did not identify any additional resources needed, but understands they are able to request SW if any additional needs arise. Sent referral to financial counseling team, who will follow up with pt and assist.     LAURIE Ramsey  Social Work  [facility]

## 2023-12-04 NOTE — PLAN OF CARE
BP elevated but asymptomatic, goal <130/80, has not taken meds today, advised to do this when she gets home  Will defer adjustments to cards, since they are managing this Vital signs stable  -140/60-80's.  Pt denies any headache, epigastric pain, visual disturbances  Mg+ infusion was stopped at 1700.  Pt had q 6 hr CBC/CMP/Mg+ level while Mg+ infusing, q 6 hr labs discontinued per MD but are ordered for the am.. Postpartum assessment WDL. Incision with sanguinous drainage noted underneath. Pain controlled with oxy, tylenol, ibuprofen, abd binder. Patient ambulating with SBA as she got dizzy while up ambulating at 1530 today.  Enc pt to call for ambulation to BR. Patient reports passing gas. Formula feeding with no assist. Patient and infant bonding well. Will continue with current plan of care.

## 2023-12-04 NOTE — PLAN OF CARE
Goal Outcome Evaluation:    VSS. Pt using Ibuprofen and tylenol for pain control. Up independently in the room. Bottle feeding infant with similac. Lovenox given. Pt has been sleeping in between feeds. Will continue the plan of care.

## 2023-12-04 NOTE — PROGRESS NOTES
[facility]   Obstetrics Post-Op / Progress Note         Assessment and Plan:    Assessment:   Post-operative day #2  Low transverse primary  section  L&D complications: Non reassuring fetal tracing       Doing well      Plan:   Routine post op cares  Social work following   Discharge on POD #3            Interval History:     Per RN pt is doing well; pt was sleeping at the time of bedside visit           Significant Problems:    None          Review of Systems:   Voiding without difficulty. Lochia normal. Ambulating. Tolerating PO. Pain well controlled. BreastfedThe patient denies any chest pain, shortness of breath, excessive pain, fever, chills, purulent drainage from the wound, nausea or vomiting.          Physical Exam:   All vitals stable  Wound clean and dry with minimal or no drainage.  Surrounding skin with minimal erythema.          Data:   All laboratory data related to this surgery reviewed    Leeanna Liu MD

## 2023-12-05 VITALS
HEIGHT: 63 IN | SYSTOLIC BLOOD PRESSURE: 136 MMHG | OXYGEN SATURATION: 100 % | BODY MASS INDEX: 47.25 KG/M2 | HEART RATE: 72 BPM | TEMPERATURE: 98.2 F | RESPIRATION RATE: 18 BRPM | DIASTOLIC BLOOD PRESSURE: 84 MMHG | WEIGHT: 266.7 LBS

## 2023-12-05 PROBLEM — O14.10 PREECLAMPSIA, SEVERE: Status: ACTIVE | Noted: 2023-12-05

## 2023-12-05 LAB
PATH REPORT.COMMENTS IMP SPEC: NORMAL
PATH REPORT.COMMENTS IMP SPEC: NORMAL
PATH REPORT.FINAL DX SPEC: NORMAL
PATH REPORT.GROSS SPEC: NORMAL
PATH REPORT.MICROSCOPIC SPEC OTHER STN: NORMAL
PATH REPORT.RELEVANT HX SPEC: NORMAL
PHOTO IMAGE: NORMAL

## 2023-12-05 PROCEDURE — 250N000011 HC RX IP 250 OP 636: Mod: JZ | Performed by: OBSTETRICS & GYNECOLOGY

## 2023-12-05 PROCEDURE — 258N000003 HC RX IP 258 OP 636: Performed by: OBSTETRICS & GYNECOLOGY

## 2023-12-05 PROCEDURE — 250N000013 HC RX MED GY IP 250 OP 250 PS 637: Performed by: OBSTETRICS & GYNECOLOGY

## 2023-12-05 PROCEDURE — 250N000011 HC RX IP 250 OP 636: Performed by: OBSTETRICS & GYNECOLOGY

## 2023-12-05 RX ORDER — ACETAMINOPHEN 325 MG/1
975 TABLET ORAL EVERY 6 HOURS
Start: 2023-12-05

## 2023-12-05 RX ORDER — OXYCODONE HYDROCHLORIDE 5 MG/1
5 TABLET ORAL EVERY 4 HOURS PRN
Qty: 15 TABLET | Refills: 0 | Status: SHIPPED | OUTPATIENT
Start: 2023-12-05

## 2023-12-05 RX ORDER — NIFEDIPINE 30 MG
30 TABLET, EXTENDED RELEASE ORAL DAILY
Qty: 30 TABLET | Refills: 1 | Status: SHIPPED | OUTPATIENT
Start: 2023-12-05

## 2023-12-05 RX ORDER — IBUPROFEN 600 MG/1
600 TABLET, FILM COATED ORAL EVERY 6 HOURS PRN
Start: 2023-12-05

## 2023-12-05 RX ORDER — NIFEDIPINE 30 MG/1
30 TABLET, EXTENDED RELEASE ORAL DAILY
Status: DISCONTINUED | OUTPATIENT
Start: 2023-12-05 | End: 2023-12-05 | Stop reason: HOSPADM

## 2023-12-05 RX ADMIN — ACETAMINOPHEN 975 MG: 325 TABLET, FILM COATED ORAL at 14:08

## 2023-12-05 RX ADMIN — DOCUSATE SODIUM 50 MG AND SENNOSIDES 8.6 MG 1 TABLET: 8.6; 5 TABLET, FILM COATED ORAL at 08:51

## 2023-12-05 RX ADMIN — IRON SUCROSE 300 MG: 20 INJECTION, SOLUTION INTRAVENOUS at 08:51

## 2023-12-05 RX ADMIN — IBUPROFEN 800 MG: 400 TABLET ORAL at 10:36

## 2023-12-05 RX ADMIN — Medication 1 TABLET: at 10:36

## 2023-12-05 RX ADMIN — ENOXAPARIN SODIUM 60 MG: 60 INJECTION SUBCUTANEOUS at 10:36

## 2023-12-05 RX ADMIN — NIFEDIPINE 30 MG: 30 TABLET, FILM COATED, EXTENDED RELEASE ORAL at 08:51

## 2023-12-05 RX ADMIN — IBUPROFEN 800 MG: 400 TABLET ORAL at 16:58

## 2023-12-05 RX ADMIN — IBUPROFEN 800 MG: 400 TABLET ORAL at 05:06

## 2023-12-05 RX ADMIN — ACETAMINOPHEN 975 MG: 325 TABLET, FILM COATED ORAL at 00:32

## 2023-12-05 RX ADMIN — ACETAMINOPHEN 975 MG: 325 TABLET, FILM COATED ORAL at 06:22

## 2023-12-05 ASSESSMENT — ACTIVITIES OF DAILY LIVING (ADL)
ADLS_ACUITY_SCORE: 20

## 2023-12-05 NOTE — DISCHARGE INSTRUCTIONS
Postpartum Vaginal Delivery Instructions    Activity     Ask family and friends for help when you need it.  Do not place anything in your vagina for 6 weeks.  You are not restricted on other activities, but take it easy for a few weeks to allow your body to recover from delivery.  You are able to do any activities you feel up to that point.  No driving until you have stopped taking your pain medications (usually two weeks after delivery).     Call your health care provider if you have any of these symptoms:     Increased pain, swelling, redness, or fluid around your stiches from an episiotomy or perineal tear.  A fever above 100.4 F (38 C) with or without chills when placing a thermometer under your tongue.  You soak a sanitary pad with blood within 1 hour, or you see blood clots larger than a golf ball.  Bleeding that lasts more than 6 weeks.  Vaginal discharge that smells bad.  Severe pain, cramping or tenderness in your lower belly area.  A need to urinate more frequently (use the toilet more often), more urgently (use the toilet very quickly), or it burns when you urinate.  Nausea and vomiting.  Redness, swelling or pain around a vein in your leg.  Problems breastfeeding or a red or painful area on your breast.  Chest pain and cough or are gasping for air.  Problems coping with sadness, anxiety, or depression.  If you have any concerns about hurting yourself or the baby, call your provider immediately.   You have questions or concerns after you return home.     Keep your hands clean:  Always wash your hands before touching your perineal area and stitches.  This helps reduce your risk of infection.  If your hands aren't dirty, you may use an alcohol hand-rub to clean your hands. Keep your nails clean and short.      Warning Signs after Having a Baby    Keep this paper on your fridge or somewhere else where you can see it.    Call your provider if you have any of these symptoms up to 12 weeks after having your  baby.    Thoughts of hurting yourself or your baby  Pain in your chest or trouble breathing  Severe headache not helped by pain medicine  Eyesight concerns (blurry vision, seeing spots or flashes of light, other changes to eyesight)  Fainting, shaking or other signs of a seizure    Call 9-1-1 if you feel that it is an emergency.     The symptoms below can happen to anyone after giving birth. They can be very serious. Call your provider if you have any of these warning signs.    My provider s phone number: _______________________    Losing too much blood (hemorrhage)    Call your provider if you soak through a pad in less than an hour or pass blood clots bigger than a golf ball. These may be signs that you are bleeding too much.    Blood clots in the legs or lungs    After you give birth, your body naturally clots its blood to help prevent blood loss. Sometimes this increased clotting can happen in other areas of the body, like the legs or lungs. This can block your blood flow and be very dangerous.     Call your provider if you:  Have a red, swollen spot on the back of your leg that is warm or painful when you touch it.   Are coughing up blood.     Infection    Call your provider if you have any of these symptoms:  Fever of 100.4 F (38 C) or higher.  Pain or redness around your stitches if you had an incision.   Any yellow, white, or green fluid coming from places where you had stitches or surgery.    Mood Problems (postpartum depression)    Many people feel sad or have mood changes after having a baby. But for some people, these mood swings are worse.     Call your provider right away if you feel so anxious or nervous that you can't care for yourself or your baby.    Preeclampsia (high blood pressure)    Even if you didn't have high blood pressure when you were pregnant, you are at risk for the high blood pressure disease called preeclampsia. This risk can last up to 12 weeks after giving birth.     Call your  provider if you have:   Pain on your right side under your rib cage  Sudden swelling in the hands and face    Remember: You know your body. If something doesn't feel right, get medical help.     For informational purposes only. Not to replace the advice of your health care provider. Copyright 2020 [employer]. All rights reserved. Clinically reviewed by Latia Rodriguez, RNC-OB, MSN. [employer] [id number] - Rev 02/23.

## 2023-12-05 NOTE — PROVIDER NOTIFICATION
Dr Pelaez notified of most recent BPs. Patient denies HA, visual disturbances or epigastric pain. Patient eager to discharge this evening. Will provide BP cuff and educate on use.

## 2023-12-05 NOTE — PROGRESS NOTES
"OB CS post op note  POD# 3    S:  Patient doing well.  Pain controlled with po meds.  Oanh reg diet, Voiding, ambulating,  Bleeding min.  Breast feeding.    O:  BP (!) 145/69 (BP Location: Left arm)   Pulse 83   Temp 98.1  F (36.7  C) (Axillary)   Resp 16   Ht 1.6 m (5' 3\")   Wt 121 kg (266 lb 11.2 oz)   LMP 2023   SpO2 100%   Breastfeeding Unknown   BMI 47.24 kg/m      Intake/Output Summary (Last 24 hours) at 2023 0826  Last data filed at 2023 0500  Gross per 24 hour   Intake 1800 ml   Output 950 ml   Net 850 ml      Gen- A&O, NAD  PULM: CTAB  CV: RRR  Abd- Non-tender, fundus firm at umbilicus  Incision: C/D/I   Ext- non-tender, trace edema    Hemoglobin   Date Value Ref Range Status   2023 9.2 (L) 11.7 - 15.7 g/dL Final     O POS  Rubella Immune    A/P: 18 year old  POD# 3 s/p PLTCS for fetal intolerance of labor    1.  Routine post-partum cares.   - Pain controlled   - Dietreg   - Ambulate    - IS as needed  2. Pre eclampsia with severe features: s/p magnesium infusion, blood pressures rising, start procardia today potential home later today or tomorrow    3. Anemia due to blood loss: started iron, give IV iron today     4.  Anticipate d/c home on POD# 3 or 4.    Billy Pelaez,   2023  8:26 AM   "

## 2023-12-05 NOTE — PLAN OF CARE
Goal Outcome Evaluation:      Plan of Care Reviewed With: patient, significant other    Overall Patient Progress: improvingOverall Patient Progress: improving     Blood pressures stable, assessment WNL. Denies epigastric pain, visual disturbances, headache. Pain controlled with Tylenol and ibuprofen; states satisfaction with pain management. Up in room independently with no complaints of dizziness, voiding without difficulty. Significant other at bedside and supportive. Encouraged to call RN with needs, call light within reach.

## 2023-12-05 NOTE — PLAN OF CARE
"Goal Outcome Evaluation:  -140s/70s today on Procardia 30 mg. Pt feels prepared for discharge. Home BP cuff given and instructed on use. BP reading on home cuff within 2 mmHg from hospital cuff. Instructed to monitor and document BP 2x/day. Instructions given verbally and written on when to call provider. Reviewed breast and engorgement care for non-lactating mother. Discharge instructions reviewed with patient and significant other. Patient denies questions or concerns and verbalizes understanding. Patient does not have tylenol or ibuprofen at home but states she \"can get some.\" Encouraged her to have family bring some this evening. Discharge to home with Monico and infant.   "

## 2023-12-05 NOTE — PLAN OF CARE
Goal Outcome Evaluation:  VSS on RA, except BPs 130s-140s/70s during the night.  Fundus firm, midline, U/U.  Scant lochia rubra.  Incision WDL.  Pt is able to empty bladder independently. Voiding adequately.  Up independently.  Pain managed w/Tylenol, Ibuprofen, and Oxycodone. No c/o headache, vision changes, or epigastric pain. +reflexes, no clonus. Pt bottle feeding formula (per parents choice).  Bonding well w/infant.  Independent w/infant cares.  Significant other at bedside.  Nursing to continue to monitor.

## 2023-12-08 ENCOUNTER — TRANSFERRED RECORDS (OUTPATIENT)
Dept: HEALTH INFORMATION MANAGEMENT | Facility: CLINIC | Age: 18
End: 2023-12-08

## 2023-12-19 ENCOUNTER — PATIENT OUTREACH (OUTPATIENT)
Dept: CARE COORDINATION | Facility: CLINIC | Age: 18
End: 2023-12-19
Payer: COMMERCIAL

## 2023-12-22 NOTE — DISCHARGE SUMMARY
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 Janet Rhodes is a 18 year old  at 39w0d who was admitted on 2023 from clinic for IOL for oligohydramnios.  Please see her admit H&P for full details of her PMH, PSH, Meds, Allergies and exam on admit.    Hospital course:  After a negative CST, cervidil was placed for cervical ripening, removed prior to <12 hours due to a prolonged deceleration with SROM shortly thereafter. During this time, she developed sustained severe range blood pressures, ruling in for preeclampsia with severe features. Magnesium was started for seizure prophylaxis and serial HELLP labs were obtained. She became uncomfortable and then received an epidural the morning of HD#2. Due to difficulty tracing contractions, an IUPC and FSE were then placed at 1145 and she was found to be 6/80/-1. She progressed to 9/90/0 at 1330. She then developed a Category II FHT and did not change her cervix. Due to inadequate contractions and inability to augment due  to a Category II FHT, the recommendation was made for a  section.    After discussion of risk and and benefits and signing informed consent the patient was taken to the operating room for an urgent primary  section.    EBL from the delivery was 715ml. Please see her  Section Operative Note for full details regarding her delivery.    She was continued on magnesium postpartum for seizure prophylaxis.     Her postoperative course was uncomplicated. On POD#3, she was meeting all of her postpartum goals and deemed stable for discharge. She was voiding without difficulty, tolerating a regular diet without nausea and vomiting, her pain was well controlled on oral pain medicines and her lochia was appropriate. Her hemoglobin prior to delivery was 12.0 and after delivery was 9.2. Her Rh status was positive and Rhogam was not indicated.      Discharge Medications:  Discharge Medication List as of 2023  3:34 PM        START taking these medications    Details   acetaminophen (TYLENOL) 325 MG tablet Take 3 tablets (975 mg) by mouth every 6 hours, No Print Out      ibuprofen (ADVIL/MOTRIN) 600 MG tablet Take 1 tablet (600 mg) by mouth every 6 hours as needed for moderate pain, No Print Out      NIFEdipine ER OSMOTIC (ADALAT CC) 30 MG 24 hr tablet Take 1 tablet (30 mg) by mouth daily, Disp-30 tablet, R-1, Local Print      oxyCODONE (ROXICODONE) 5 MG tablet Take 1 tablet (5 mg) by mouth every 4 hours as needed for moderate to severe pain, Disp-15 tablet, R-0, Local Print           CONTINUE these medications which have NOT CHANGED    Details   Prenatal Vit-Fe Fumarate-FA (PRENATAL VITAMINS PO) Take 1 tablet by mouth daily, Historical           Discharge/Disposition:  Janet Rhodes was discharged to home in stable condition with the following instructions/medications:  1) Call for temperature > 100.4, bright red vaginal bleeding >1 pad an hour x 2 hours, foul smelling vaginal discharge, pain not  controlled by usual oral pain meds, persistent nausea and vomiting not controlled on medications, drainage or redness from incision site  2) For feeding she decided to bottle feed.  3) She was instructed to follow-up with her primary OB in 1-2 weeks for an incision, mood and blood pressure check, and 6 weeks for a routine postpartum visit.  4) Discharge activity:  No heavy lifting >15 lbs or strenuous activity for 6 weeks, pelvic rest for 6 weeks, no driving or operating machinery while on narcotics.      Kamilla Barbour MD  [facility]  12/5/2023

## 2024-05-24 ENCOUNTER — LAB REQUISITION (OUTPATIENT)
Dept: LAB | Facility: CLINIC | Age: 19
End: 2024-05-24
Payer: COMMERCIAL

## 2024-05-24 DIAGNOSIS — O36.80X9 PREGNANCY WITH INCONCLUSIVE FETAL VIABILITY, OTHER FETUS: ICD-10-CM

## 2024-05-24 LAB
HCG INTACT+B SERPL-ACNC: 8205 MIU/ML
HGB BLD-MCNC: 13.3 G/DL (ref 11.7–15.7)

## 2024-05-24 PROCEDURE — 85018 HEMOGLOBIN: CPT | Mod: ORL | Performed by: OBSTETRICS & GYNECOLOGY

## 2024-05-24 PROCEDURE — 84702 CHORIONIC GONADOTROPIN TEST: CPT | Mod: ORL | Performed by: OBSTETRICS & GYNECOLOGY

## 2024-05-26 ENCOUNTER — LAB (OUTPATIENT)
Dept: LAB | Facility: CLINIC | Age: 19
End: 2024-05-26
Payer: COMMERCIAL

## 2024-05-26 DIAGNOSIS — O36.80X9 PREGNANCY WITH INCONCLUSIVE FETAL VIABILITY, OTHER FETUS: Primary | ICD-10-CM

## 2024-05-26 LAB — HCG INTACT+B SERPL-ACNC: 9307 MIU/ML

## 2024-05-26 PROCEDURE — 84702 CHORIONIC GONADOTROPIN TEST: CPT

## 2024-05-26 PROCEDURE — 36415 COLL VENOUS BLD VENIPUNCTURE: CPT

## 2024-05-28 ENCOUNTER — LAB REQUISITION (OUTPATIENT)
Dept: LAB | Facility: CLINIC | Age: 19
End: 2024-05-28
Payer: COMMERCIAL

## 2024-05-28 DIAGNOSIS — O36.80X9 PREGNANCY WITH INCONCLUSIVE FETAL VIABILITY, OTHER FETUS: ICD-10-CM

## 2024-05-28 LAB — HCG INTACT+B SERPL-ACNC: 8689 MIU/ML

## 2024-05-28 PROCEDURE — 84702 CHORIONIC GONADOTROPIN TEST: CPT | Mod: ORL | Performed by: OBSTETRICS & GYNECOLOGY

## 2024-10-09 ENCOUNTER — APPOINTMENT (OUTPATIENT)
Dept: ULTRASOUND IMAGING | Facility: CLINIC | Age: 19
End: 2024-10-09
Attending: EMERGENCY MEDICINE
Payer: COMMERCIAL

## 2024-10-09 ENCOUNTER — HOSPITAL ENCOUNTER (EMERGENCY)
Facility: CLINIC | Age: 19
Discharge: HOME | End: 2024-10-09
Attending: EMERGENCY MEDICINE | Admitting: EMERGENCY MEDICINE
Payer: COMMERCIAL

## 2024-10-09 VITALS
BODY MASS INDEX: 43.77 KG/M2 | TEMPERATURE: 97 F | RESPIRATION RATE: 18 BRPM | OXYGEN SATURATION: 99 % | WEIGHT: 247 LBS | HEART RATE: 58 BPM | HEIGHT: 63 IN | SYSTOLIC BLOOD PRESSURE: 132 MMHG | DIASTOLIC BLOOD PRESSURE: 66 MMHG

## 2024-10-09 DIAGNOSIS — O26.891 ABDOMINAL PAIN DURING PREGNANCY IN FIRST TRIMESTER: ICD-10-CM

## 2024-10-09 DIAGNOSIS — R10.9 ABDOMINAL PAIN DURING PREGNANCY IN FIRST TRIMESTER: ICD-10-CM

## 2024-10-09 LAB
ALBUMIN SERPL BCG-MCNC: 4.2 G/DL (ref 3.5–5.2)
ALBUMIN UR-MCNC: 30 MG/DL
ALP SERPL-CCNC: 56 U/L (ref 40–150)
ALT SERPL W P-5'-P-CCNC: 17 U/L (ref 0–50)
ANION GAP SERPL CALCULATED.3IONS-SCNC: 14 MMOL/L (ref 7–15)
APPEARANCE UR: CLEAR
AST SERPL W P-5'-P-CCNC: 18 U/L (ref 0–45)
BASOPHILS # BLD AUTO: 0 10E3/UL (ref 0–0.2)
BASOPHILS NFR BLD AUTO: 0 %
BILIRUB SERPL-MCNC: 0.2 MG/DL
BILIRUB UR QL STRIP: NEGATIVE
BUN SERPL-MCNC: 10.7 MG/DL (ref 6–20)
CALCIUM SERPL-MCNC: 9.2 MG/DL (ref 8.8–10.4)
CHLORIDE SERPL-SCNC: 104 MMOL/L (ref 98–107)
COLOR UR AUTO: YELLOW
CREAT SERPL-MCNC: 0.76 MG/DL (ref 0.51–0.95)
EGFRCR SERPLBLD CKD-EPI 2021: >90 ML/MIN/1.73M2
EOSINOPHIL # BLD AUTO: 0.1 10E3/UL (ref 0–0.7)
EOSINOPHIL NFR BLD AUTO: 1 %
ERYTHROCYTE [DISTWIDTH] IN BLOOD BY AUTOMATED COUNT: 13.1 % (ref 10–15)
GLUCOSE SERPL-MCNC: 83 MG/DL (ref 70–99)
GLUCOSE UR STRIP-MCNC: NEGATIVE MG/DL
HCG INTACT+B SERPL-ACNC: ABNORMAL MIU/ML
HCO3 SERPL-SCNC: 22 MMOL/L (ref 22–29)
HCT VFR BLD AUTO: 40.7 % (ref 35–47)
HGB BLD-MCNC: 13.3 G/DL (ref 11.7–15.7)
HGB UR QL STRIP: NEGATIVE
HOLD SPECIMEN: NORMAL
HOLD SPECIMEN: NORMAL
IMM GRANULOCYTES # BLD: 0 10E3/UL
IMM GRANULOCYTES NFR BLD: 0 %
KETONES UR STRIP-MCNC: 100 MG/DL
LEUKOCYTE ESTERASE UR QL STRIP: NEGATIVE
LIPASE SERPL-CCNC: 27 U/L (ref 13–60)
LYMPHOCYTES # BLD AUTO: 2.3 10E3/UL (ref 0.8–5.3)
LYMPHOCYTES NFR BLD AUTO: 26 %
MCH RBC QN AUTO: 29.5 PG (ref 26.5–33)
MCHC RBC AUTO-ENTMCNC: 32.7 G/DL (ref 31.5–36.5)
MCV RBC AUTO: 90 FL (ref 78–100)
MONOCYTES # BLD AUTO: 0.5 10E3/UL (ref 0–1.3)
MONOCYTES NFR BLD AUTO: 6 %
MUCOUS THREADS #/AREA URNS LPF: PRESENT /LPF
NEUTROPHILS # BLD AUTO: 6.1 10E3/UL (ref 1.6–8.3)
NEUTROPHILS NFR BLD AUTO: 67 %
NITRATE UR QL: NEGATIVE
NRBC # BLD AUTO: 0 10E3/UL
NRBC BLD AUTO-RTO: 0 /100
PH UR STRIP: 6.5 [PH] (ref 5–7)
PLATELET # BLD AUTO: 248 10E3/UL (ref 150–450)
POTASSIUM SERPL-SCNC: 3.6 MMOL/L (ref 3.4–5.3)
PROT SERPL-MCNC: 7 G/DL (ref 6.4–8.3)
RBC # BLD AUTO: 4.51 10E6/UL (ref 3.8–5.2)
RBC URINE: 1 /HPF
SODIUM SERPL-SCNC: 140 MMOL/L (ref 135–145)
SP GR UR STRIP: 1.03 (ref 1–1.03)
SQUAMOUS EPITHELIAL: 15 /HPF
UROBILINOGEN UR STRIP-MCNC: NORMAL MG/DL
WBC # BLD AUTO: 9.1 10E3/UL (ref 4–11)
WBC URINE: 1 /HPF

## 2024-10-09 PROCEDURE — 99285 EMERGENCY DEPT VISIT HI MDM: CPT | Mod: 25

## 2024-10-09 PROCEDURE — 85025 COMPLETE CBC W/AUTO DIFF WBC: CPT | Performed by: EMERGENCY MEDICINE

## 2024-10-09 PROCEDURE — 36415 COLL VENOUS BLD VENIPUNCTURE: CPT | Performed by: EMERGENCY MEDICINE

## 2024-10-09 PROCEDURE — 82040 ASSAY OF SERUM ALBUMIN: CPT | Performed by: EMERGENCY MEDICINE

## 2024-10-09 PROCEDURE — 83690 ASSAY OF LIPASE: CPT | Performed by: EMERGENCY MEDICINE

## 2024-10-09 PROCEDURE — 84702 CHORIONIC GONADOTROPIN TEST: CPT | Performed by: EMERGENCY MEDICINE

## 2024-10-09 PROCEDURE — 81001 URINALYSIS AUTO W/SCOPE: CPT | Performed by: EMERGENCY MEDICINE

## 2024-10-09 PROCEDURE — 76801 OB US < 14 WKS SINGLE FETUS: CPT

## 2024-10-09 ASSESSMENT — COLUMBIA-SUICIDE SEVERITY RATING SCALE - C-SSRS
6. HAVE YOU EVER DONE ANYTHING, STARTED TO DO ANYTHING, OR PREPARED TO DO ANYTHING TO END YOUR LIFE?: NO
1. IN THE PAST MONTH, HAVE YOU WISHED YOU WERE DEAD OR WISHED YOU COULD GO TO SLEEP AND NOT WAKE UP?: NO
1. IN THE PAST MONTH, HAVE YOU WISHED YOU WERE DEAD OR WISHED YOU COULD GO TO SLEEP AND NOT WAKE UP?: NO
6. HAVE YOU EVER DONE ANYTHING, STARTED TO DO ANYTHING, OR PREPARED TO DO ANYTHING TO END YOUR LIFE?: NO
2. HAVE YOU ACTUALLY HAD ANY THOUGHTS OF KILLING YOURSELF IN THE PAST MONTH?: NO
2. HAVE YOU ACTUALLY HAD ANY THOUGHTS OF KILLING YOURSELF IN THE PAST MONTH?: NO

## 2024-10-09 ASSESSMENT — ACTIVITIES OF DAILY LIVING (ADL)
ADLS_ACUITY_SCORE: 35
ADLS_ACUITY_SCORE: 35

## 2024-10-09 NOTE — ED TRIAGE NOTES
Pt c/o lower abdominal pain today. Pt is  and is 12w 2d pregnant. Pt has had nausea and intermittent emesis for the pregnancy. Pain is below c section scar and is constant. Denies vaginal bleeding or leaking of fluid. Pt denies diarrhea, had a normal BM yesterday.      Triage Assessment (Adult)       Row Name 10/09/24 1426          Triage Assessment    Airway WDL WDL        Respiratory WDL    Respiratory WDL WDL        Skin Circulation/Temperature WDL    Skin Circulation/Temperature WDL WDL        Cardiac WDL    Cardiac WDL WDL        Peripheral/Neurovascular WDL    Peripheral Neurovascular WDL WDL        Cognitive/Neuro/Behavioral WDL    Cognitive/Neuro/Behavioral WDL WDL

## 2024-10-09 NOTE — ED PROVIDER NOTES
"  Emergency Department Note      History of Present Illness     Chief Complaint  Abdominal Pain      HPI  Janet Rhodes is a 19 year old female in her 11th week of pregnancy with a history notable for miscarriage and preeclampsia who presents with abdominal pain. Patient reports she has been experiencing some abdominal cramping in her lower abdomen, which onset today. No vomiting, diarrhea, fevers, vaginal bleeding or discharge. She has not been seen by a regular OB yet, but was seen at \"[facility]\", which is a mobile bus clinic, where she reportedly had an US done within the past few weeks. She is planning to see her prior OB soon, who is located in [place].     Independent Historian  None    Review of External Notes  I reviewed the OB notes from 6/11 regarding first trimester miscarriage back in June.     Past Medical History   Medical History and Problem List  Depression  Anxiety  Miscarriage   Preeclampsia     Medications  The patient is not currently taking any prescribed medications.    Surgical History   C section (x2)    Physical Exam   Patient Vitals for the past 24 hrs:   BP Temp Temp src Pulse Resp SpO2 Height Weight   10/09/24 1422 (!) 154/88 97  F (36.1  C) Temporal 67 20 99 % 1.6 m (5' 3\") 112 kg (247 lb)     Physical Exam  General: Patient is well appearing. No distress.  Head: Atraumatic.  Eyes: Conjunctivae and EOM are normal. No scleral icterus.  Neck: Normal range of motion. Neck supple.   Cardiovascular: Normal rate, regular rhythm, normal heart sounds and intact distal pulses.   Pulmonary/Chest: Breath sounds normal. No respiratory distress.  Abdominal: Soft. Bowel sounds are normal. No distension. No tenderness. No rebound or guarding.   Musculoskeletal: Normal range of motion.  Skin: Warm and dry. No rash noted. Not diaphoretic.      Diagnostics   Lab Results   Labs Ordered and Resulted from Time of ED Arrival to Time of ED Departure   ROUTINE UA WITH MICROSCOPIC REFLEX TO CULTURE - " Abnormal       Result Value    Color Urine Yellow      Appearance Urine Clear      Glucose Urine Negative      Bilirubin Urine Negative      Ketones Urine 100 (*)     Specific Gravity Urine 1.030      Blood Urine Negative      pH Urine 6.5      Protein Albumin Urine 30 (*)     Urobilinogen Urine Normal      Nitrite Urine Negative      Leukocyte Esterase Urine Negative      Mucus Urine Present (*)     RBC Urine 1      WBC Urine 1      Squamous Epithelials Urine 15 (*)    HCG QUANTITATIVE PREGNANCY - Abnormal    hCG Quantitative 37,004 (*)    COMPREHENSIVE METABOLIC PANEL - Normal    Sodium 140      Potassium 3.6      Carbon Dioxide (CO2) 22      Anion Gap 14      Urea Nitrogen 10.7      Creatinine 0.76      GFR Estimate >90      Calcium 9.2      Chloride 104      Glucose 83      Alkaline Phosphatase 56      AST 18      ALT 17      Protein Total 7.0      Albumin 4.2      Bilirubin Total 0.2     LIPASE - Normal    Lipase 27     CBC WITH PLATELETS AND DIFFERENTIAL    WBC Count 9.1      RBC Count 4.51      Hemoglobin 13.3      Hematocrit 40.7      MCV 90      MCH 29.5      MCHC 32.7      RDW 13.1      Platelet Count 248      % Neutrophils 67      % Lymphocytes 26      % Monocytes 6      % Eosinophils 1      % Basophils 0      % Immature Granulocytes 0      NRBCs per 100 WBC 0      Absolute Neutrophils 6.1      Absolute Lymphocytes 2.3      Absolute Monocytes 0.5      Absolute Eosinophils 0.1      Absolute Basophils 0.0      Absolute Immature Granulocytes 0.0      Absolute NRBCs 0.0         Imaging  US OB < 14 Weeks Single   Final Result   IMPRESSION:    1.  Single intrauterine gestation measuring 11 weeks 4 days by   crown-rump length. Embryonic cardiac activity is present. JER   4/27/2025.   2.  Probable left corpus luteum. Right ovary not visualized due to   bowel gas.   3.  A probable uterine contraction is noted. Recommend correlation   with clinical symptoms.      AMANDA MIRANDA MD            SYSTEM ID:   [id number]        Independent Interpretation  None    ED Course    Medications Administered  Medications - No data to display    Discussion of Management  None    Social Determinants of Health adding to complexity of care  None    ED Course  ED Course as of 10/09/24 1634   Wed Oct 09, 2024   [id number] I evaluated the patient.        Medical Decision Making / Diagnosis     KATELYN  Janet Rhodes is a 19 year old female with no red flags on exam for abdomen no severe pain on objective observations with normal vitals signs and no VB VD.  US reassuringly normal at this point.  Extensive labs and urine without abnl.  Given expectant instructions possibilities of threatened  early pregnancy pain or other abdominal not fully evident at this time.  Home with recommending OB PCP visits and strict return and follow up.      Disposition  The patient was discharged.     ICD-10 Codes:    ICD-10-CM    1. Abdominal pain during pregnancy in first trimester  O26.891     R10.9            Discharge Medications  New Prescriptions    No medications on file     Scribe Disclosure:  I, AYO HERRERA, am serving as a scribe at 2:39 PM on 10/9/2024 to document services personally performed by Braydon Barriga MD based on my observations and the provider's statements to me.     [employer]      Braydon Barriga MD  10/09/24 [id number]

## 2024-10-11 ENCOUNTER — LAB REQUISITION (OUTPATIENT)
Dept: LAB | Facility: CLINIC | Age: 19
End: 2024-10-11
Payer: COMMERCIAL

## 2024-10-11 DIAGNOSIS — Z12.4 ENCOUNTER FOR SCREENING FOR MALIGNANT NEOPLASM OF CERVIX: ICD-10-CM

## 2024-10-11 DIAGNOSIS — Z34.81 ENCOUNTER FOR SUPERVISION OF OTHER NORMAL PREGNANCY, FIRST TRIMESTER: ICD-10-CM

## 2024-10-11 DIAGNOSIS — Z87.59 PERSONAL HISTORY OF OTHER COMPLICATIONS OF PREGNANCY, CHILDBIRTH AND THE PUERPERIUM: ICD-10-CM

## 2024-10-11 LAB
ABO/RH(D): NORMAL
ANTIBODY SCREEN: NEGATIVE
BASOPHILS # BLD AUTO: 0 10E3/UL (ref 0–0.2)
BASOPHILS NFR BLD AUTO: 0 %
EOSINOPHIL # BLD AUTO: 0.1 10E3/UL (ref 0–0.7)
EOSINOPHIL NFR BLD AUTO: 1 %
ERYTHROCYTE [DISTWIDTH] IN BLOOD BY AUTOMATED COUNT: 13.2 % (ref 10–15)
EST. AVERAGE GLUCOSE BLD GHB EST-MCNC: 105 MG/DL
HBA1C MFR BLD: 5.3 %
HCT VFR BLD AUTO: 39.7 % (ref 35–47)
HGB BLD-MCNC: 13 G/DL (ref 11.7–15.7)
HIV 1+2 AB+HIV1 P24 AG SERPL QL IA: NONREACTIVE
IMM GRANULOCYTES # BLD: 0 10E3/UL
IMM GRANULOCYTES NFR BLD: 0 %
LYMPHOCYTES # BLD AUTO: 2.7 10E3/UL (ref 0.8–5.3)
LYMPHOCYTES NFR BLD AUTO: 34 %
MCH RBC QN AUTO: 30.1 PG (ref 26.5–33)
MCHC RBC AUTO-ENTMCNC: 32.7 G/DL (ref 31.5–36.5)
MCV RBC AUTO: 92 FL (ref 78–100)
MONOCYTES # BLD AUTO: 0.4 10E3/UL (ref 0–1.3)
MONOCYTES NFR BLD AUTO: 6 %
NEUTROPHILS # BLD AUTO: 4.6 10E3/UL (ref 1.6–8.3)
NEUTROPHILS NFR BLD AUTO: 59 %
NRBC # BLD AUTO: 0 10E3/UL
NRBC BLD AUTO-RTO: 0 /100
PLATELET # BLD AUTO: 249 10E3/UL (ref 150–450)
RBC # BLD AUTO: 4.32 10E6/UL (ref 3.8–5.2)
RUBV IGG SERPL QL IA: 0.47 INDEX
RUBV IGG SERPL QL IA: NORMAL
SPECIMEN EXPIRATION DATE: NORMAL
T PALLIDUM AB SER QL: NONREACTIVE
WBC # BLD AUTO: 7.8 10E3/UL (ref 4–11)

## 2024-10-11 PROCEDURE — 86762 RUBELLA ANTIBODY: CPT | Mod: ORL | Performed by: NURSE PRACTITIONER

## 2024-10-11 PROCEDURE — 86780 TREPONEMA PALLIDUM: CPT | Performed by: NURSE PRACTITIONER

## 2024-10-11 PROCEDURE — 86762 RUBELLA ANTIBODY: CPT | Performed by: NURSE PRACTITIONER

## 2024-10-11 PROCEDURE — 87086 URINE CULTURE/COLONY COUNT: CPT | Performed by: NURSE PRACTITIONER

## 2024-10-11 PROCEDURE — 84450 TRANSFERASE (AST) (SGOT): CPT | Performed by: NURSE PRACTITIONER

## 2024-10-11 PROCEDURE — 87389 HIV-1 AG W/HIV-1&-2 AB AG IA: CPT | Performed by: NURSE PRACTITIONER

## 2024-10-11 PROCEDURE — 87491 CHLMYD TRACH DNA AMP PROBE: CPT | Mod: ORL | Performed by: NURSE PRACTITIONER

## 2024-10-11 PROCEDURE — 85025 COMPLETE CBC W/AUTO DIFF WBC: CPT | Performed by: NURSE PRACTITIONER

## 2024-10-11 PROCEDURE — 86780 TREPONEMA PALLIDUM: CPT | Mod: ORL | Performed by: NURSE PRACTITIONER

## 2024-10-11 PROCEDURE — 87340 HEPATITIS B SURFACE AG IA: CPT | Performed by: NURSE PRACTITIONER

## 2024-10-11 PROCEDURE — 86900 BLOOD TYPING SEROLOGIC ABO: CPT | Mod: ORL | Performed by: NURSE PRACTITIONER

## 2024-10-11 PROCEDURE — 87491 CHLMYD TRACH DNA AMP PROBE: CPT | Performed by: NURSE PRACTITIONER

## 2024-10-11 PROCEDURE — 86803 HEPATITIS C AB TEST: CPT | Mod: ORL | Performed by: NURSE PRACTITIONER

## 2024-10-11 PROCEDURE — 84460 ALANINE AMINO (ALT) (SGPT): CPT | Performed by: NURSE PRACTITIONER

## 2024-10-11 PROCEDURE — 87086 URINE CULTURE/COLONY COUNT: CPT | Mod: ORL | Performed by: NURSE PRACTITIONER

## 2024-10-11 PROCEDURE — G0145 SCR C/V CYTO,THINLAYER,RESCR: HCPCS | Mod: ORL | Performed by: NURSE PRACTITIONER

## 2024-10-11 PROCEDURE — 86850 RBC ANTIBODY SCREEN: CPT | Performed by: NURSE PRACTITIONER

## 2024-10-11 PROCEDURE — 83036 HEMOGLOBIN GLYCOSYLATED A1C: CPT | Performed by: NURSE PRACTITIONER

## 2024-10-12 LAB
ALT SERPL W P-5'-P-CCNC: 16 U/L (ref 0–50)
AST SERPL W P-5'-P-CCNC: 17 U/L (ref 0–45)
BACTERIA UR CULT: NO GROWTH
C TRACH DNA SPEC QL PROBE+SIG AMP: NEGATIVE
HBV SURFACE AG SERPL QL IA: NONREACTIVE
HCV AB SERPL QL IA: NONREACTIVE
N GONORRHOEA DNA SPEC QL NAA+PROBE: NEGATIVE

## 2024-10-16 LAB
BKR LAB AP GYN ADEQUACY: NORMAL
BKR LAB AP GYN INTERPRETATION: NORMAL
BKR LAB AP HPV REFLEX: NORMAL
BKR LAB AP LMP: NORMAL
BKR LAB AP PREVIOUS ABNL DX: NORMAL
BKR LAB AP PREVIOUS ABNORMAL: NORMAL
PATH REPORT.COMMENTS IMP SPEC: NORMAL
PATH REPORT.COMMENTS IMP SPEC: NORMAL
PATH REPORT.RELEVANT HX SPEC: NORMAL

## 2024-10-24 ENCOUNTER — TRANSFERRED RECORDS (OUTPATIENT)
Dept: HEALTH INFORMATION MANAGEMENT | Facility: CLINIC | Age: 19
End: 2024-10-24
Payer: COMMERCIAL

## 2024-10-24 ENCOUNTER — TRANSCRIBE ORDERS (OUTPATIENT)
Dept: MATERNAL FETAL MEDICINE | Facility: CLINIC | Age: 19
End: 2024-10-24
Payer: COMMERCIAL

## 2024-10-24 ENCOUNTER — MEDICAL CORRESPONDENCE (OUTPATIENT)
Dept: HEALTH INFORMATION MANAGEMENT | Facility: CLINIC | Age: 19
End: 2024-10-24
Payer: COMMERCIAL

## 2024-10-24 DIAGNOSIS — O26.90 PREGNANCY RELATED CONDITION, ANTEPARTUM: Primary | ICD-10-CM

## 2024-11-14 ENCOUNTER — TRANSFERRED RECORDS (OUTPATIENT)
Dept: HEALTH INFORMATION MANAGEMENT | Facility: CLINIC | Age: 19
End: 2024-11-14
Payer: COMMERCIAL

## 2024-11-14 ENCOUNTER — PRE VISIT (OUTPATIENT)
Dept: MATERNAL FETAL MEDICINE | Facility: CLINIC | Age: 19
End: 2024-11-14
Payer: COMMERCIAL

## 2024-11-21 ENCOUNTER — OFFICE VISIT (OUTPATIENT)
Dept: MATERNAL FETAL MEDICINE | Facility: CLINIC | Age: 19
End: 2024-11-21
Attending: OBSTETRICS & GYNECOLOGY
Payer: COMMERCIAL

## 2024-11-21 ENCOUNTER — HOSPITAL ENCOUNTER (OUTPATIENT)
Dept: ULTRASOUND IMAGING | Facility: CLINIC | Age: 19
End: 2024-11-21
Attending: OBSTETRICS & GYNECOLOGY
Payer: COMMERCIAL

## 2024-11-21 DIAGNOSIS — O99.212 MATERNAL OBESITY SYNDROME IN SECOND TRIMESTER: Primary | ICD-10-CM

## 2024-11-21 DIAGNOSIS — O26.90 PREGNANCY RELATED CONDITION, ANTEPARTUM: ICD-10-CM

## 2024-11-21 PROCEDURE — 76811 OB US DETAILED SNGL FETUS: CPT

## 2024-11-21 NOTE — NURSING NOTE
Patient presents to [facility] for L2 at 18w3d due to BMI 42. Denies LOF, vaginal bleeding, cramping/contractions. SBAR given to [facility], see their note in Epic.

## 2024-11-21 NOTE — PROGRESS NOTES
Please see full imaging report from [employer] program under imaging tab.    Thank-you for referring your patient for ultrasound assessment.    I discussed the findings on today's ultrasound with the patient. She agrees with change of JER as we have recommended. I reviewed the limitations of ultrasound both in detecting aneuploidy and structural abnormalities.  Ultrasound, when views completed, can routinely detect 80-90% of structural abnormalities. She had low risk cell free fetal DNA for genetic screening this pregnancy.      Follow-up is scheduled here in 3-4 weeks to reassess anatomy that was suboptimally seen today.     Return to primary provider for continued prenatal care. Additional US after anatomy scan is completed are anticipated with [facility], to include:   1. Growth US at 28, and 34 weeks  2. Weekly BPP at 34 weeks    If you have questions regarding today's evaluation or if we can be of further service, please contact the [facility].    **Fetal anomalies may be present but not detected**     I spent a total of 20 minutes on the date of this encounter including preparing to see the patient (reviewing medical records/tests), counseling and discussing the plan of care, documenting the visit in the electronic medical record, and communicating with other health care professionals and/or care coordination.      Michele Russo MD  Maternal Fetal Medicine

## 2024-12-15 ENCOUNTER — HEALTH MAINTENANCE LETTER (OUTPATIENT)
Age: 19
End: 2024-12-15

## 2025-01-06 ENCOUNTER — DOCUMENTATION ONLY (OUTPATIENT)
Dept: MATERNAL FETAL MEDICINE | Facility: CLINIC | Age: 20
End: 2025-01-06
Payer: COMMERCIAL

## 2025-01-13 ENCOUNTER — OFFICE VISIT (OUTPATIENT)
Dept: MATERNAL FETAL MEDICINE | Facility: CLINIC | Age: 20
End: 2025-01-13
Attending: OBSTETRICS & GYNECOLOGY
Payer: COMMERCIAL

## 2025-01-13 ENCOUNTER — HOSPITAL ENCOUNTER (OUTPATIENT)
Dept: ULTRASOUND IMAGING | Facility: CLINIC | Age: 20
Discharge: HOME | End: 2025-01-13
Attending: OBSTETRICS & GYNECOLOGY
Payer: COMMERCIAL

## 2025-01-13 DIAGNOSIS — O99.212 MATERNAL OBESITY SYNDROME IN SECOND TRIMESTER: Primary | ICD-10-CM

## 2025-01-13 DIAGNOSIS — O99.212 MATERNAL OBESITY SYNDROME IN SECOND TRIMESTER: ICD-10-CM

## 2025-01-13 PROCEDURE — 76816 OB US FOLLOW-UP PER FETUS: CPT

## 2025-02-24 ENCOUNTER — LAB REQUISITION (OUTPATIENT)
Dept: LAB | Facility: CLINIC | Age: 20
End: 2025-02-24
Payer: COMMERCIAL

## 2025-02-24 DIAGNOSIS — Z36.89 ENCOUNTER FOR OTHER SPECIFIED ANTENATAL SCREENING: ICD-10-CM

## 2025-02-24 LAB
ERYTHROCYTE [DISTWIDTH] IN BLOOD BY AUTOMATED COUNT: 13.2 % (ref 10–15)
HCT VFR BLD AUTO: 38.4 % (ref 35–47)
HGB BLD-MCNC: 12.3 G/DL (ref 11.7–15.7)
MCH RBC QN AUTO: 31.3 PG (ref 26.5–33)
MCHC RBC AUTO-ENTMCNC: 32 G/DL (ref 31.5–36.5)
MCV RBC AUTO: 98 FL (ref 78–100)
PLATELET # BLD AUTO: 261 10E3/UL (ref 150–450)
RBC # BLD AUTO: 3.93 10E6/UL (ref 3.8–5.2)
WBC # BLD AUTO: 8.4 10E3/UL (ref 4–11)

## 2025-02-24 PROCEDURE — 85027 COMPLETE CBC AUTOMATED: CPT | Mod: ORL | Performed by: NURSE PRACTITIONER

## 2025-02-24 PROCEDURE — 86592 SYPHILIS TEST NON-TREP QUAL: CPT | Mod: ORL | Performed by: NURSE PRACTITIONER

## 2025-02-25 LAB — RPR SER QL: NONREACTIVE

## 2025-03-13 DIAGNOSIS — Z00.00 LABORATORY EXAMINATION ORDERED AS PART OF A ROUTINE GENERAL MEDICAL EXAMINATION: Primary | ICD-10-CM

## 2025-03-14 RX ORDER — OXYTOCIN/0.9 % SODIUM CHLORIDE 30/500 ML
100-340 PLASTIC BAG, INJECTION (ML) INTRAVENOUS CONTINUOUS PRN
Status: CANCELLED | OUTPATIENT
Start: 2025-03-14

## 2025-03-14 RX ORDER — OXYTOCIN 10 [USP'U]/ML
10 INJECTION, SOLUTION INTRAMUSCULAR; INTRAVENOUS
Status: CANCELLED | OUTPATIENT
Start: 2025-03-14

## 2025-03-14 RX ORDER — TRANEXAMIC ACID 10 MG/ML
1 INJECTION, SOLUTION INTRAVENOUS EVERY 30 MIN PRN
Status: CANCELLED | OUTPATIENT
Start: 2025-03-14

## 2025-03-14 RX ORDER — METHYLERGONOVINE MALEATE 0.2 MG/ML
200 INJECTION INTRAVENOUS
Status: CANCELLED | OUTPATIENT
Start: 2025-03-14

## 2025-03-14 RX ORDER — CITRIC ACID/SODIUM CITRATE 334-500MG
30 SOLUTION, ORAL ORAL
Status: CANCELLED | OUTPATIENT
Start: 2025-03-14

## 2025-03-14 RX ORDER — LOPERAMIDE HYDROCHLORIDE 2 MG/1
4 CAPSULE ORAL
Status: CANCELLED | OUTPATIENT
Start: 2025-03-14

## 2025-03-14 RX ORDER — OXYTOCIN/0.9 % SODIUM CHLORIDE 30/500 ML
340 PLASTIC BAG, INJECTION (ML) INTRAVENOUS CONTINUOUS PRN
Status: CANCELLED | OUTPATIENT
Start: 2025-03-14

## 2025-03-14 RX ORDER — LIDOCAINE 40 MG/G
CREAM TOPICAL
Status: CANCELLED | OUTPATIENT
Start: 2025-03-14

## 2025-03-14 RX ORDER — MISOPROSTOL 200 UG/1
400 TABLET ORAL
Status: CANCELLED | OUTPATIENT
Start: 2025-03-14

## 2025-03-14 RX ORDER — SODIUM CHLORIDE, SODIUM LACTATE, POTASSIUM CHLORIDE, CALCIUM CHLORIDE 600; 310; 30; 20 MG/100ML; MG/100ML; MG/100ML; MG/100ML
INJECTION, SOLUTION INTRAVENOUS CONTINUOUS
Status: CANCELLED | OUTPATIENT
Start: 2025-03-14

## 2025-03-14 RX ORDER — CEFAZOLIN SODIUM/WATER 2 G/20 ML
2 SYRINGE (ML) INTRAVENOUS
Status: CANCELLED | OUTPATIENT
Start: 2025-03-14

## 2025-03-14 RX ORDER — CEFAZOLIN SODIUM/WATER 2 G/20 ML
2 SYRINGE (ML) INTRAVENOUS SEE ADMIN INSTRUCTIONS
Status: CANCELLED | OUTPATIENT
Start: 2025-03-14

## 2025-03-14 RX ORDER — LOPERAMIDE HYDROCHLORIDE 2 MG/1
2 CAPSULE ORAL
Status: CANCELLED | OUTPATIENT
Start: 2025-03-14

## 2025-03-14 RX ORDER — MISOPROSTOL 200 UG/1
800 TABLET ORAL
Status: CANCELLED | OUTPATIENT
Start: 2025-03-14

## 2025-03-14 RX ORDER — CARBOPROST TROMETHAMINE 250 UG/ML
250 INJECTION, SOLUTION INTRAMUSCULAR
Status: CANCELLED | OUTPATIENT
Start: 2025-03-14

## 2025-03-14 RX ORDER — ACETAMINOPHEN 325 MG/1
975 TABLET ORAL ONCE
Status: CANCELLED | OUTPATIENT
Start: 2025-03-14 | End: 2025-03-14

## 2025-04-02 ENCOUNTER — LAB REQUISITION (OUTPATIENT)
Dept: LAB | Facility: CLINIC | Age: 20
End: 2025-04-02
Payer: COMMERCIAL

## 2025-04-02 DIAGNOSIS — Z3A.36 36 WEEKS GESTATION OF PREGNANCY: ICD-10-CM

## 2025-04-02 PROCEDURE — 87653 STREP B DNA AMP PROBE: CPT | Mod: ORL | Performed by: OBSTETRICS & GYNECOLOGY

## 2025-04-03 LAB — GP B STREP DNA SPEC QL NAA+PROBE: NEGATIVE

## 2025-04-20 LAB
ABO + RH BLD: NORMAL
BLD GP AB SCN SERPL QL: NEGATIVE
SPECIMEN EXP DATE BLD: NORMAL

## 2025-04-21 ENCOUNTER — LAB (OUTPATIENT)
Dept: LAB | Facility: CLINIC | Age: 20
End: 2025-04-21
Payer: COMMERCIAL

## 2025-04-21 ENCOUNTER — ANESTHESIA EVENT (OUTPATIENT)
Dept: OBGYN | Facility: CLINIC | Age: 20
End: 2025-04-21
Payer: COMMERCIAL

## 2025-04-21 DIAGNOSIS — Z00.00 LABORATORY EXAMINATION ORDERED AS PART OF A ROUTINE GENERAL MEDICAL EXAMINATION: ICD-10-CM

## 2025-04-21 LAB
HGB BLD-MCNC: 11.6 G/DL (ref 11.7–15.7)
PLATELET # BLD AUTO: 220 10E3/UL (ref 150–450)

## 2025-04-21 PROCEDURE — 85018 HEMOGLOBIN: CPT

## 2025-04-21 PROCEDURE — 86780 TREPONEMA PALLIDUM: CPT

## 2025-04-21 PROCEDURE — 86900 BLOOD TYPING SEROLOGIC ABO: CPT

## 2025-04-21 PROCEDURE — 85049 AUTOMATED PLATELET COUNT: CPT

## 2025-04-21 PROCEDURE — 36415 COLL VENOUS BLD VENIPUNCTURE: CPT

## 2025-04-22 ENCOUNTER — ANESTHESIA (OUTPATIENT)
Dept: OBGYN | Facility: CLINIC | Age: 20
End: 2025-04-22
Payer: COMMERCIAL

## 2025-04-22 ENCOUNTER — HOSPITAL ENCOUNTER (INPATIENT)
Facility: CLINIC | Age: 20
LOS: 2 days | Discharge: HOME | End: 2025-04-24
Attending: OBSTETRICS & GYNECOLOGY | Admitting: OBSTETRICS & GYNECOLOGY
Payer: COMMERCIAL

## 2025-04-22 DIAGNOSIS — Z98.891 S/P CESAREAN SECTION: Primary | ICD-10-CM

## 2025-04-22 LAB
BASOPHILS # BLD AUTO: NORMAL 10*3/UL
BASOPHILS NFR BLD AUTO: NORMAL %
CREAT SERPL-MCNC: 0.42 MG/DL (ref 0.51–0.95)
EGFRCR SERPLBLD CKD-EPI 2021: >90 ML/MIN/1.73M2
EOSINOPHIL # BLD AUTO: NORMAL 10*3/UL
EOSINOPHIL NFR BLD AUTO: NORMAL %
ERYTHROCYTE [DISTWIDTH] IN BLOOD BY AUTOMATED COUNT: NORMAL %
HCT VFR BLD AUTO: NORMAL %
HGB BLD-MCNC: NORMAL G/DL
IMM GRANULOCYTES # BLD: NORMAL 10*3/UL
IMM GRANULOCYTES NFR BLD: NORMAL %
LYMPHOCYTES # BLD AUTO: 1 10E3/UL (ref 0.8–5.3)
LYMPHOCYTES NFR BLD AUTO: NORMAL %
MCH RBC QN AUTO: NORMAL PG
MCHC RBC AUTO-ENTMCNC: NORMAL G/DL
MCV RBC AUTO: NORMAL FL
MONOCYTES # BLD AUTO: NORMAL 10*3/UL
MONOCYTES NFR BLD AUTO: NORMAL %
NEUTROPHILS # BLD AUTO: NORMAL 10*3/UL
NEUTROPHILS NFR BLD AUTO: NORMAL %
NRBC # BLD AUTO: NORMAL 10*3/UL
NRBC BLD AUTO-RTO: NORMAL %
PLATELET # BLD AUTO: NORMAL 10*3/UL
RBC # BLD AUTO: NORMAL 10*6/UL
T PALLIDUM AB SER QL: NONREACTIVE
WBC # BLD AUTO: NORMAL 10*3/UL

## 2025-04-22 PROCEDURE — 999N000016 HC STATISTIC ATTENDANCE AT DELIVERY

## 2025-04-22 PROCEDURE — 272N000001 HC OR GENERAL SUPPLY STERILE: Performed by: OBSTETRICS & GYNECOLOGY

## 2025-04-22 PROCEDURE — 250N000011 HC RX IP 250 OP 636: Mod: JZ | Performed by: NURSE ANESTHETIST, CERTIFIED REGISTERED

## 2025-04-22 PROCEDURE — 85004 AUTOMATED DIFF WBC COUNT: CPT | Performed by: OBSTETRICS & GYNECOLOGY

## 2025-04-22 PROCEDURE — 258N000003 HC RX IP 258 OP 636: Performed by: OBSTETRICS & GYNECOLOGY

## 2025-04-22 PROCEDURE — 250N000013 HC RX MED GY IP 250 OP 250 PS 637: Performed by: OBSTETRICS & GYNECOLOGY

## 2025-04-22 PROCEDURE — 370N000017 HC ANESTHESIA TECHNICAL FEE, PER MIN: Performed by: OBSTETRICS & GYNECOLOGY

## 2025-04-22 PROCEDURE — 250N000011 HC RX IP 250 OP 636: Performed by: ANESTHESIOLOGY

## 2025-04-22 PROCEDURE — 120N000013 HC R&B IMCU

## 2025-04-22 PROCEDURE — 258N000003 HC RX IP 258 OP 636: Performed by: NURSE ANESTHETIST, CERTIFIED REGISTERED

## 2025-04-22 PROCEDURE — 999N000157 HC STATISTIC RCP TIME EA 10 MIN

## 2025-04-22 PROCEDURE — P9045 ALBUMIN (HUMAN), 5%, 250 ML: HCPCS | Performed by: NURSE ANESTHETIST, CERTIFIED REGISTERED

## 2025-04-22 PROCEDURE — 250N000011 HC RX IP 250 OP 636: Performed by: OBSTETRICS & GYNECOLOGY

## 2025-04-22 PROCEDURE — 250N000009 HC RX 250: Performed by: NURSE ANESTHETIST, CERTIFIED REGISTERED

## 2025-04-22 PROCEDURE — 82565 ASSAY OF CREATININE: CPT | Performed by: OBSTETRICS & GYNECOLOGY

## 2025-04-22 PROCEDURE — 710N000009 HC RECOVERY PHASE 1, LEVEL 1, PER MIN: Performed by: OBSTETRICS & GYNECOLOGY

## 2025-04-22 PROCEDURE — 250N000011 HC RX IP 250 OP 636: Mod: JZ | Performed by: OBSTETRICS & GYNECOLOGY

## 2025-04-22 PROCEDURE — 360N000076 HC SURGERY LEVEL 3, PER MIN: Performed by: OBSTETRICS & GYNECOLOGY

## 2025-04-22 PROCEDURE — 36415 COLL VENOUS BLD VENIPUNCTURE: CPT | Performed by: OBSTETRICS & GYNECOLOGY

## 2025-04-22 RX ORDER — ACETAMINOPHEN 325 MG/1
975 TABLET ORAL EVERY 6 HOURS
Status: DISCONTINUED | OUTPATIENT
Start: 2025-04-22 | End: 2025-04-24 | Stop reason: HOSPADM

## 2025-04-22 RX ORDER — ACETAMINOPHEN 325 MG/1
TABLET ORAL
Status: COMPLETED
Start: 2025-04-22 | End: 2025-04-22

## 2025-04-22 RX ORDER — SCOPOLAMINE 1 MG/3D
1 PATCH, EXTENDED RELEASE TRANSDERMAL ONCE
Status: DISCONTINUED | OUTPATIENT
Start: 2025-04-22 | End: 2025-04-24 | Stop reason: HOSPADM

## 2025-04-22 RX ORDER — OXYTOCIN/0.9 % SODIUM CHLORIDE 30/500 ML
100-340 PLASTIC BAG, INJECTION (ML) INTRAVENOUS CONTINUOUS PRN
Status: DISCONTINUED | OUTPATIENT
Start: 2025-04-22 | End: 2025-04-22

## 2025-04-22 RX ORDER — METOCLOPRAMIDE HYDROCHLORIDE 5 MG/ML
10 INJECTION INTRAMUSCULAR; INTRAVENOUS EVERY 6 HOURS PRN
Status: DISCONTINUED | OUTPATIENT
Start: 2025-04-22 | End: 2025-04-24 | Stop reason: HOSPADM

## 2025-04-22 RX ORDER — OXYTOCIN 10 [USP'U]/ML
10 INJECTION, SOLUTION INTRAMUSCULAR; INTRAVENOUS
Status: DISCONTINUED | OUTPATIENT
Start: 2025-04-22 | End: 2025-04-22

## 2025-04-22 RX ORDER — BUPIVACAINE HYDROCHLORIDE 7.5 MG/ML
INJECTION, SOLUTION INTRASPINAL PRN
Status: DISCONTINUED | OUTPATIENT
Start: 2025-04-22 | End: 2025-04-22

## 2025-04-22 RX ORDER — ENOXAPARIN SODIUM 100 MG/ML
40 INJECTION SUBCUTANEOUS EVERY 12 HOURS
Status: DISCONTINUED | OUTPATIENT
Start: 2025-04-23 | End: 2025-04-24 | Stop reason: HOSPADM

## 2025-04-22 RX ORDER — LOPERAMIDE HYDROCHLORIDE 2 MG/1
4 CAPSULE ORAL
Status: DISCONTINUED | OUTPATIENT
Start: 2025-04-22 | End: 2025-04-24 | Stop reason: HOSPADM

## 2025-04-22 RX ORDER — LIDOCAINE 40 MG/G
CREAM TOPICAL
Status: DISCONTINUED | OUTPATIENT
Start: 2025-04-22 | End: 2025-04-22

## 2025-04-22 RX ORDER — CITRIC ACID/SODIUM CITRATE 334-500MG
30 SOLUTION, ORAL ORAL
Status: COMPLETED | OUTPATIENT
Start: 2025-04-22 | End: 2025-04-22

## 2025-04-22 RX ORDER — KETOROLAC TROMETHAMINE 15 MG/ML
15 INJECTION, SOLUTION INTRAMUSCULAR; INTRAVENOUS EVERY 6 HOURS
Status: COMPLETED | OUTPATIENT
Start: 2025-04-22 | End: 2025-04-23

## 2025-04-22 RX ORDER — KETOROLAC TROMETHAMINE 30 MG/ML
INJECTION, SOLUTION INTRAMUSCULAR; INTRAVENOUS PRN
Status: DISCONTINUED | OUTPATIENT
Start: 2025-04-22 | End: 2025-04-22

## 2025-04-22 RX ORDER — MISOPROSTOL 200 UG/1
800 TABLET ORAL
Status: DISCONTINUED | OUTPATIENT
Start: 2025-04-22 | End: 2025-04-24 | Stop reason: HOSPADM

## 2025-04-22 RX ORDER — ONDANSETRON 2 MG/ML
4 INJECTION INTRAMUSCULAR; INTRAVENOUS EVERY 6 HOURS PRN
Status: DISCONTINUED | OUTPATIENT
Start: 2025-04-22 | End: 2025-04-24 | Stop reason: HOSPADM

## 2025-04-22 RX ORDER — SODIUM CHLORIDE, SODIUM LACTATE, POTASSIUM CHLORIDE, CALCIUM CHLORIDE 600; 310; 30; 20 MG/100ML; MG/100ML; MG/100ML; MG/100ML
INJECTION, SOLUTION INTRAVENOUS CONTINUOUS
Status: DISCONTINUED | OUTPATIENT
Start: 2025-04-22 | End: 2025-04-22

## 2025-04-22 RX ORDER — AMOXICILLIN 250 MG
1 CAPSULE ORAL 2 TIMES DAILY
Status: DISCONTINUED | OUTPATIENT
Start: 2025-04-22 | End: 2025-04-24 | Stop reason: HOSPADM

## 2025-04-22 RX ORDER — ONDANSETRON 2 MG/ML
INJECTION INTRAMUSCULAR; INTRAVENOUS PRN
Status: DISCONTINUED | OUTPATIENT
Start: 2025-04-22 | End: 2025-04-22

## 2025-04-22 RX ORDER — CEFAZOLIN SODIUM/WATER 2 G/20 ML
2 SYRINGE (ML) INTRAVENOUS SEE ADMIN INSTRUCTIONS
Status: DISCONTINUED | OUTPATIENT
Start: 2025-04-22 | End: 2025-04-22

## 2025-04-22 RX ORDER — TRANEXAMIC ACID 10 MG/ML
1 INJECTION, SOLUTION INTRAVENOUS EVERY 30 MIN PRN
Status: DISCONTINUED | OUTPATIENT
Start: 2025-04-22 | End: 2025-04-24 | Stop reason: HOSPADM

## 2025-04-22 RX ORDER — CEFAZOLIN SODIUM/WATER 3 G/30 ML
SYRINGE (ML) INTRAVENOUS
Status: DISCONTINUED
Start: 2025-04-22 | End: 2025-04-22 | Stop reason: HOSPADM

## 2025-04-22 RX ORDER — OXYTOCIN 10 [USP'U]/ML
10 INJECTION, SOLUTION INTRAMUSCULAR; INTRAVENOUS
Status: DISCONTINUED | OUTPATIENT
Start: 2025-04-22 | End: 2025-04-24 | Stop reason: HOSPADM

## 2025-04-22 RX ORDER — MORPHINE SULFATE 1 MG/ML
100 INJECTION, SOLUTION EPIDURAL; INTRATHECAL; INTRAVENOUS ONCE
Status: COMPLETED | OUTPATIENT
Start: 2025-04-22 | End: 2025-04-22

## 2025-04-22 RX ORDER — DEXTROSE, SODIUM CHLORIDE, SODIUM LACTATE, POTASSIUM CHLORIDE, AND CALCIUM CHLORIDE 5; .6; .31; .03; .02 G/100ML; G/100ML; G/100ML; G/100ML; G/100ML
INJECTION, SOLUTION INTRAVENOUS CONTINUOUS
Status: DISCONTINUED | OUTPATIENT
Start: 2025-04-22 | End: 2025-04-24 | Stop reason: HOSPADM

## 2025-04-22 RX ORDER — HYDROMORPHONE HCL IN WATER/PF 6 MG/30 ML
PATIENT CONTROLLED ANALGESIA SYRINGE INTRAVENOUS
Status: COMPLETED
Start: 2025-04-22 | End: 2025-04-22

## 2025-04-22 RX ORDER — CEFAZOLIN SODIUM/WATER 2 G/20 ML
2 SYRINGE (ML) INTRAVENOUS
Status: COMPLETED | OUTPATIENT
Start: 2025-04-22 | End: 2025-04-22

## 2025-04-22 RX ORDER — NALOXONE HYDROCHLORIDE 0.4 MG/ML
0.2 INJECTION, SOLUTION INTRAMUSCULAR; INTRAVENOUS; SUBCUTANEOUS
Status: DISCONTINUED | OUTPATIENT
Start: 2025-04-22 | End: 2025-04-24 | Stop reason: HOSPADM

## 2025-04-22 RX ORDER — LIDOCAINE 40 MG/G
CREAM TOPICAL
Status: DISCONTINUED | OUTPATIENT
Start: 2025-04-22 | End: 2025-04-24 | Stop reason: HOSPADM

## 2025-04-22 RX ORDER — ONDANSETRON 2 MG/ML
4 INJECTION INTRAMUSCULAR; INTRAVENOUS EVERY 4 HOURS PRN
Status: DISCONTINUED | OUTPATIENT
Start: 2025-04-22 | End: 2025-04-22

## 2025-04-22 RX ORDER — PRENATAL VIT/IRON FUM/FOLIC AC 27MG-0.8MG
1 TABLET ORAL DAILY
Status: DISCONTINUED | OUTPATIENT
Start: 2025-04-23 | End: 2025-04-24 | Stop reason: HOSPADM

## 2025-04-22 RX ORDER — FENTANYL CITRATE 50 UG/ML
INJECTION, SOLUTION INTRAMUSCULAR; INTRAVENOUS PRN
Status: DISCONTINUED | OUTPATIENT
Start: 2025-04-22 | End: 2025-04-22

## 2025-04-22 RX ORDER — LOPERAMIDE HYDROCHLORIDE 2 MG/1
2 CAPSULE ORAL
Status: DISCONTINUED | OUTPATIENT
Start: 2025-04-22 | End: 2025-04-24 | Stop reason: HOSPADM

## 2025-04-22 RX ORDER — SODIUM PHOSPHATE,MONO-DIBASIC 19G-7G/118
1 ENEMA (ML) RECTAL DAILY PRN
Status: DISCONTINUED | OUTPATIENT
Start: 2025-04-24 | End: 2025-04-24 | Stop reason: HOSPADM

## 2025-04-22 RX ORDER — METHYLERGONOVINE MALEATE 0.2 MG/ML
200 INJECTION INTRAVENOUS
Status: DISCONTINUED | OUTPATIENT
Start: 2025-04-22 | End: 2025-04-24 | Stop reason: HOSPADM

## 2025-04-22 RX ORDER — NALOXONE HYDROCHLORIDE 0.4 MG/ML
0.4 INJECTION, SOLUTION INTRAMUSCULAR; INTRAVENOUS; SUBCUTANEOUS
Status: DISCONTINUED | OUTPATIENT
Start: 2025-04-22 | End: 2025-04-24 | Stop reason: HOSPADM

## 2025-04-22 RX ORDER — CEFAZOLIN SODIUM/WATER 2 G/20 ML
SYRINGE (ML) INTRAVENOUS
Status: DISCONTINUED
Start: 2025-04-22 | End: 2025-04-22 | Stop reason: WASHOUT

## 2025-04-22 RX ORDER — TRANEXAMIC ACID 10 MG/ML
1 INJECTION, SOLUTION INTRAVENOUS EVERY 30 MIN PRN
Status: DISCONTINUED | OUTPATIENT
Start: 2025-04-22 | End: 2025-04-22

## 2025-04-22 RX ORDER — OXYTOCIN/0.9 % SODIUM CHLORIDE 30/500 ML
340 PLASTIC BAG, INJECTION (ML) INTRAVENOUS CONTINUOUS PRN
Status: DISCONTINUED | OUTPATIENT
Start: 2025-04-22 | End: 2025-04-22

## 2025-04-22 RX ORDER — SIMETHICONE 80 MG
80 TABLET,CHEWABLE ORAL 4 TIMES DAILY PRN
Status: DISCONTINUED | OUTPATIENT
Start: 2025-04-22 | End: 2025-04-24 | Stop reason: HOSPADM

## 2025-04-22 RX ORDER — OXYCODONE HYDROCHLORIDE 5 MG/1
5 TABLET ORAL EVERY 4 HOURS PRN
Status: DISCONTINUED | OUTPATIENT
Start: 2025-04-22 | End: 2025-04-22

## 2025-04-22 RX ORDER — OXYTOCIN/0.9 % SODIUM CHLORIDE 30/500 ML
340 PLASTIC BAG, INJECTION (ML) INTRAVENOUS CONTINUOUS PRN
Status: DISCONTINUED | OUTPATIENT
Start: 2025-04-22 | End: 2025-04-24 | Stop reason: HOSPADM

## 2025-04-22 RX ORDER — OXYCODONE HYDROCHLORIDE 5 MG/1
5-10 TABLET ORAL EVERY 4 HOURS PRN
Status: DISCONTINUED | OUTPATIENT
Start: 2025-04-22 | End: 2025-04-24 | Stop reason: HOSPADM

## 2025-04-22 RX ORDER — LOPERAMIDE HYDROCHLORIDE 2 MG/1
2 CAPSULE ORAL
Status: DISCONTINUED | OUTPATIENT
Start: 2025-04-22 | End: 2025-04-22

## 2025-04-22 RX ORDER — LOPERAMIDE HYDROCHLORIDE 2 MG/1
4 CAPSULE ORAL
Status: DISCONTINUED | OUTPATIENT
Start: 2025-04-22 | End: 2025-04-22

## 2025-04-22 RX ORDER — BISACODYL 10 MG
10 SUPPOSITORY, RECTAL RECTAL DAILY PRN
Status: DISCONTINUED | OUTPATIENT
Start: 2025-04-24 | End: 2025-04-24 | Stop reason: HOSPADM

## 2025-04-22 RX ORDER — HYDROCORTISONE 25 MG/G
CREAM TOPICAL 3 TIMES DAILY PRN
Status: DISCONTINUED | OUTPATIENT
Start: 2025-04-22 | End: 2025-04-24 | Stop reason: HOSPADM

## 2025-04-22 RX ORDER — PROCHLORPERAZINE MALEATE 10 MG
10 TABLET ORAL EVERY 6 HOURS PRN
Status: DISCONTINUED | OUTPATIENT
Start: 2025-04-22 | End: 2025-04-24 | Stop reason: HOSPADM

## 2025-04-22 RX ORDER — IBUPROFEN 400 MG/1
800 TABLET, FILM COATED ORAL EVERY 6 HOURS
Status: DISCONTINUED | OUTPATIENT
Start: 2025-04-23 | End: 2025-04-24 | Stop reason: HOSPADM

## 2025-04-22 RX ORDER — CITRIC ACID/SODIUM CITRATE 334-500MG
SOLUTION, ORAL ORAL
Status: COMPLETED
Start: 2025-04-22 | End: 2025-04-22

## 2025-04-22 RX ORDER — CARBOPROST TROMETHAMINE 250 UG/ML
250 INJECTION, SOLUTION INTRAMUSCULAR
Status: DISCONTINUED | OUTPATIENT
Start: 2025-04-22 | End: 2025-04-24 | Stop reason: HOSPADM

## 2025-04-22 RX ORDER — CARBOPROST TROMETHAMINE 250 UG/ML
250 INJECTION, SOLUTION INTRAMUSCULAR
Status: DISCONTINUED | OUTPATIENT
Start: 2025-04-22 | End: 2025-04-22

## 2025-04-22 RX ORDER — METOCLOPRAMIDE 10 MG/1
10 TABLET ORAL EVERY 6 HOURS PRN
Status: DISCONTINUED | OUTPATIENT
Start: 2025-04-22 | End: 2025-04-24 | Stop reason: HOSPADM

## 2025-04-22 RX ORDER — ACETAMINOPHEN 325 MG/1
975 TABLET ORAL ONCE
Status: COMPLETED | OUTPATIENT
Start: 2025-04-22 | End: 2025-04-22

## 2025-04-22 RX ORDER — DIPHENHYDRAMINE HCL 25 MG
25 CAPSULE ORAL EVERY 6 HOURS PRN
Status: DISCONTINUED | OUTPATIENT
Start: 2025-04-22 | End: 2025-04-24 | Stop reason: HOSPADM

## 2025-04-22 RX ORDER — MISOPROSTOL 200 UG/1
400 TABLET ORAL
Status: DISCONTINUED | OUTPATIENT
Start: 2025-04-22 | End: 2025-04-22

## 2025-04-22 RX ORDER — AMOXICILLIN 250 MG
2 CAPSULE ORAL 2 TIMES DAILY
Status: DISCONTINUED | OUTPATIENT
Start: 2025-04-22 | End: 2025-04-24 | Stop reason: HOSPADM

## 2025-04-22 RX ORDER — DIPHENHYDRAMINE HYDROCHLORIDE 50 MG/ML
12.5 INJECTION, SOLUTION INTRAMUSCULAR; INTRAVENOUS EVERY 6 HOURS PRN
Status: DISCONTINUED | OUTPATIENT
Start: 2025-04-22 | End: 2025-04-24 | Stop reason: HOSPADM

## 2025-04-22 RX ORDER — HYDROMORPHONE HYDROCHLORIDE 1 MG/ML
0.3 INJECTION, SOLUTION INTRAMUSCULAR; INTRAVENOUS; SUBCUTANEOUS
Status: DISCONTINUED | OUTPATIENT
Start: 2025-04-22 | End: 2025-04-24 | Stop reason: HOSPADM

## 2025-04-22 RX ORDER — METHYLERGONOVINE MALEATE 0.2 MG/ML
200 INJECTION INTRAVENOUS
Status: DISCONTINUED | OUTPATIENT
Start: 2025-04-22 | End: 2025-04-22

## 2025-04-22 RX ORDER — PROPOFOL 10 MG/ML
INJECTION, EMULSION INTRAVENOUS PRN
Status: DISCONTINUED | OUTPATIENT
Start: 2025-04-22 | End: 2025-04-22

## 2025-04-22 RX ORDER — MISOPROSTOL 200 UG/1
800 TABLET ORAL
Status: DISCONTINUED | OUTPATIENT
Start: 2025-04-22 | End: 2025-04-22

## 2025-04-22 RX ORDER — MISOPROSTOL 200 UG/1
400 TABLET ORAL
Status: DISCONTINUED | OUTPATIENT
Start: 2025-04-22 | End: 2025-04-24 | Stop reason: HOSPADM

## 2025-04-22 RX ORDER — ONDANSETRON 4 MG/1
4 TABLET, ORALLY DISINTEGRATING ORAL EVERY 6 HOURS PRN
Status: DISCONTINUED | OUTPATIENT
Start: 2025-04-22 | End: 2025-04-24 | Stop reason: HOSPADM

## 2025-04-22 RX ORDER — OXYCODONE HYDROCHLORIDE 5 MG/1
TABLET ORAL
Status: COMPLETED
Start: 2025-04-22 | End: 2025-04-22

## 2025-04-22 RX ORDER — OXYTOCIN/0.9 % SODIUM CHLORIDE 30/500 ML
PLASTIC BAG, INJECTION (ML) INTRAVENOUS CONTINUOUS PRN
Status: DISCONTINUED | OUTPATIENT
Start: 2025-04-22 | End: 2025-04-22

## 2025-04-22 RX ADMIN — PROPOFOL 20 MG: 10 INJECTION, EMULSION INTRAVENOUS at 14:56

## 2025-04-22 RX ADMIN — KETOROLAC TROMETHAMINE 30 MG: 30 INJECTION, SOLUTION INTRAMUSCULAR at 15:00

## 2025-04-22 RX ADMIN — FENTANYL CITRATE 15 MCG: 50 INJECTION INTRAMUSCULAR; INTRAVENOUS at 13:21

## 2025-04-22 RX ADMIN — PROPOFOL 30 MG: 10 INJECTION, EMULSION INTRAVENOUS at 15:02

## 2025-04-22 RX ADMIN — ALBUMIN (HUMAN): 12.5 SOLUTION INTRAVENOUS at 13:59

## 2025-04-22 RX ADMIN — OXYCODONE HYDROCHLORIDE 5 MG: 5 TABLET ORAL at 16:39

## 2025-04-22 RX ADMIN — PHENYLEPHRINE HYDROCHLORIDE 200 MCG: 10 INJECTION INTRAVENOUS at 13:59

## 2025-04-22 RX ADMIN — SODIUM CHLORIDE, SODIUM LACTATE, POTASSIUM CHLORIDE, AND CALCIUM CHLORIDE: .6; .31; .03; .02 INJECTION, SOLUTION INTRAVENOUS at 12:12

## 2025-04-22 RX ADMIN — ACETAMINOPHEN 975 MG: 325 TABLET, FILM COATED ORAL at 18:24

## 2025-04-22 RX ADMIN — DEXTROSE, SODIUM CHLORIDE, SODIUM LACTATE, POTASSIUM CHLORIDE, AND CALCIUM CHLORIDE: 5; .6; .31; .03; .02 INJECTION, SOLUTION INTRAVENOUS at 18:24

## 2025-04-22 RX ADMIN — PHENYLEPHRINE HYDROCHLORIDE 200 MCG: 10 INJECTION INTRAVENOUS at 13:56

## 2025-04-22 RX ADMIN — PROPOFOL 20 MG: 10 INJECTION, EMULSION INTRAVENOUS at 14:59

## 2025-04-22 RX ADMIN — ACETAMINOPHEN 975 MG: 325 TABLET ORAL at 12:50

## 2025-04-22 RX ADMIN — SENNOSIDES AND DOCUSATE SODIUM 1 TABLET: 50; 8.6 TABLET ORAL at 21:03

## 2025-04-22 RX ADMIN — KETOROLAC TROMETHAMINE 15 MG: 15 INJECTION, SOLUTION INTRAMUSCULAR; INTRAVENOUS at 22:11

## 2025-04-22 RX ADMIN — OXYCODONE HYDROCHLORIDE 10 MG: 5 TABLET ORAL at 21:03

## 2025-04-22 RX ADMIN — SODIUM CHLORIDE, SODIUM LACTATE, POTASSIUM CHLORIDE, AND CALCIUM CHLORIDE: .6; .31; .03; .02 INJECTION, SOLUTION INTRAVENOUS at 13:59

## 2025-04-22 RX ADMIN — HYDROMORPHONE HYDROCHLORIDE 0.2 MG: 0.2 INJECTION, SOLUTION INTRAMUSCULAR; INTRAVENOUS; SUBCUTANEOUS at 16:15

## 2025-04-22 RX ADMIN — HYDROMORPHONE HYDROCHLORIDE 0.3 MG: 1 INJECTION, SOLUTION INTRAMUSCULAR; INTRAVENOUS; SUBCUTANEOUS at 16:20

## 2025-04-22 RX ADMIN — MORPHINE SULFATE 100 MCG: 1 INJECTION, SOLUTION EPIDURAL; INTRATHECAL; INTRAVENOUS at 13:21

## 2025-04-22 RX ADMIN — Medication 3 G: at 13:15

## 2025-04-22 RX ADMIN — Medication 340 ML/HR: at 14:22

## 2025-04-22 RX ADMIN — SODIUM CITRATE AND CITRIC ACID MONOHYDRATE 30 ML: 500; 334 SOLUTION ORAL at 12:51

## 2025-04-22 RX ADMIN — BUPIVACAINE HYDROCHLORIDE IN DEXTROSE 12 MG: 7.5 INJECTION, SOLUTION SUBARACHNOID at 13:54

## 2025-04-22 RX ADMIN — BUPIVACAINE HYDROCHLORIDE IN DEXTROSE 10.5 MG: 7.5 INJECTION, SOLUTION SUBARACHNOID at 13:21

## 2025-04-22 RX ADMIN — ONDANSETRON 4 MG: 2 INJECTION INTRAMUSCULAR; INTRAVENOUS at 13:53

## 2025-04-22 RX ADMIN — SIMETHICONE 80 MG: 80 TABLET, CHEWABLE ORAL at 22:11

## 2025-04-22 RX ADMIN — ACETAMINOPHEN 975 MG: 325 TABLET, FILM COATED ORAL at 12:50

## 2025-04-22 RX ADMIN — Medication 30 ML: at 12:51

## 2025-04-22 RX ADMIN — PHENYLEPHRINE HYDROCHLORIDE 0.2 MCG/KG/MIN: 10 INJECTION INTRAVENOUS at 13:22

## 2025-04-22 RX ADMIN — HYDROMORPHONE HYDROCHLORIDE 0.1 MG: 0.2 INJECTION, SOLUTION INTRAMUSCULAR; INTRAVENOUS; SUBCUTANEOUS at 16:16

## 2025-04-22 ASSESSMENT — ACTIVITIES OF DAILY LIVING (ADL)
ADLS_ACUITY_SCORE: 27
ADLS_ACUITY_SCORE: 26
ADLS_ACUITY_SCORE: 26
ADLS_ACUITY_SCORE: 24
ADLS_ACUITY_SCORE: 27
ADLS_ACUITY_SCORE: 26
ADLS_ACUITY_SCORE: 27

## 2025-04-22 NOTE — ANESTHESIA PROCEDURE NOTES
"Intrathecal Procedure Note    Pre-Procedure   Staff -        Anesthesiologist:  Zac Pollard MD       Performed By: Anesthesiologist       Location: OR       Pre-Anesthestic Checklist: patient identified, IV checked, risks and benefits discussed, informed consent, monitors and equipment checked, pre-op evaluation and at physician/surgeon's request  Timeout:       Correct Patient: Yes        Correct Procedure: Yes        Correct Site: Yes        Correct Position: Yes   Procedure Documentation  Procedure: intrathecal         Patient Position: sitting       Patient Prep/Sterile Barriers: sterile gloves, mask, patient draped       Skin prep: Chloraprep       Insertion Site: L3-4. (midline approach).       Spinal Needle Type: Xiao-Tello       Introducer used (17G Tuohy)       Introducer: 20 G       # of attempts: 1 and  # of redirects:  1    Assessment/Narrative         Paresthesias: No.       CSF fluid: clear.     Comments:  T4 sensory level confirmed bilaterally prior to incision.   The patient was prepped and draped in a sterile fashion.  Topical anesthesia was injected subcutaneously using 1% lidocaine.  17G Tuohy needle advanced into ligamentum flavum, with SHARDA to saline at 7.5 cm at the L3-4 level.  A 24G Xiao Tello needle was advanced via a the Tuohy.  Clear CSF obtained, with birefringence on aspiration.  CSF freely aspirated after injection of 12 mg bupivacaine.  No paresthesias reported by patient, who tolerated the procedure well.      FOR [facility] ([facility]) ONLY:   Pain Team Contact information: please page the Pain Team Via [employer]. Search \"Pain\". During daytime hours, please page the attending first. At night please page the resident first.      "

## 2025-04-22 NOTE — H&P
"OB Brief Admit H&P    No significant change in general health status based on examination of the patient, review of Nursing Admission Database and prenatal record.    Pt is a 20 year old  @ 39w3d who presented to L&D for scheduled C/S.     Patient's prenatal course has been complicated by:  - History of prior C/S  - Obesity  - Anxiety and depression on medications  - Rubella immune  - History of preE  - History of FGR, EFW 31% at 36 wks    Prenatal Labs:    Blood type O positive  Rubella nonimmune    GBS negative    EFW: 6.5-7 lbs    /75   Temp 97.9  F (36.6  C) (Temporal)   Resp 16   Ht 1.6 m (5' 3\")   Wt 122 kg (269 lb)   LMP 07/15/2024 (Approximate)   BMI 47.65 kg/m    EFM:  baseline 120 bpm, moderate variability, accels present, no decels  Progress: irritability  SVE: deferred  Membranes:  intact    Assessment:  20 year old  @ 39w3d admitted for scheduled repeat  section.     Plan:  1. Admit to labor and delivery   2. Labs completed yesterday  3. Reviewed risks, benefits, alternatives including risk of bleeding, infection, injury to adjacent. Informed consent signed. All questions answered.     Charline Jara MD  2025  12:19 PM      "

## 2025-04-22 NOTE — PLAN OF CARE
Dr. Strong, [facility] notified of patients persistent sinus bradycardia in the PACU. BP is WNL @ 108 /76 MAP 80. Pt is A&Ox3, O2 sats 99% on room air,  RR14, cap refill WNL. No new  orders. Plan of care ongoing.

## 2025-04-22 NOTE — BRIEF OP NOTE
[facility]    Brief Operative Note    Pre-operative diagnosis: History of  delivery [Z98.891], 39 weeks gestation  Post-operative diagnosis Same as pre-operative diagnosis    Procedure: Repeat  section, N/A - Abdomen    Surgeon: Surgeons and Role:     * Charline Jara MD - Primary     * Lisa Alvarado MD - Assisting  Anesthesia: Spinal   Estimated Blood Loss: 420 mL  IVF: 1700 ml crystalloid, 250 mL albumin  UOP: 250 mL    Specimens:   ID Type Source Tests Collected by Time Destination   A :  Tissue Umbilical Cord OR DOCUMENTATION ONLY Charline Jara MD 2025  2:22 PM    B :  Cord blood Umbilical Cord OR DOCUMENTATION ONLY Charline Jara MD 2025  2:22 PM    C :  Placenta Placenta OR DOCUMENTATION ONLY Charline Jara MD 2025  2:22 PM      Findings:   Baby boy in vertex presentation, APGARs 9 and 9, weight, 8 lbs 9 oz (3890g). Dense subcutaneous and fascial adhesions.  Omentum adherent to anterior abdominal wall in the midline. Normal appearing uterus, bilateral fallopian tubes and ovaries.   Complications: None.  Implants: * No implants in log *        Charline Jara MD

## 2025-04-22 NOTE — PLAN OF CARE
Goal Outcome Evaluation:      Plan of Care Reviewed With: patient, significant other    Overall Patient Progress: improvingOverall Patient Progress: improving         Patient arrived to unit around 1730. Patient and infant safety, security and unit orientation gone over with patient and significant other. Encouraged to call with any questions/concerns. VSS, fundus firm, scant flow, rico draining adequately, remains on bedrest until frequent checks complete. Breastfeeding encouraged at least 8x in 24 hours. Pain well controlled with tylenol, toradol and will call when needing oxycodone. Will continue to monitor.

## 2025-04-22 NOTE — ANESTHESIA CARE TRANSFER NOTE
Patient: Janet Rhodes    Procedure: Procedure(s):  Repeat  section       Diagnosis: History of  delivery [Z98.891]  Diagnosis Additional Information: No value filed.    Anesthesia Type:   Spinal     Note:    Oropharynx: oropharynx clear of all foreign objects  Level of Consciousness: awake  Oxygen Supplementation: room air    Independent Airway: airway patency satisfactory and stable  Dentition: dentition unchanged  Vital Signs Stable: post-procedure vital signs reviewed and stable  Report to RN Given: handoff report given  Patient transferred to: PACU    Handoff Report: Identifed the Patient, Identified the Reponsible Provider, Reviewed the pertinent medical history, Discussed the surgical course, Reviewed Intra-OP anesthesia mangement and issues during anesthesia, Set expectations for post-procedure period and Allowed opportunity for questions and acknowledgement of understanding      Vitals:  Vitals Value Taken Time   /80    Temp 36.5 C    Pulse 85    Resp 16    SpO2 97%        Electronically Signed By: FREIDA Robins CRNA  2025  3:16 PM

## 2025-04-22 NOTE — ANESTHESIA POSTPROCEDURE EVALUATION
Patient: Janet Rhodes    Procedure: Procedure(s):  Repeat  section       Anesthesia Type:  Spinal    Note:     Postop Pain Control: Uneventful            Sign Out: Well controlled pain   PONV: No   Neuro/Psych: Uneventful            Sign Out: Acceptable/Baseline neuro status   Airway/Respiratory: Uneventful            Sign Out: Acceptable/Baseline resp. status   CV/Hemodynamics: Uneventful            Sign Out: Acceptable CV status; No obvious hypovolemia; No obvious fluid overload   Other NRE: NONE   DID A NON-ROUTINE EVENT OCCUR? No           Last vitals:  Vitals Value Taken Time   /94 25 1600   Temp     Pulse 58 25 1607   Resp 7 25 1607   SpO2 100 % 25 1607   Vitals shown include unfiled device data.    Electronically Signed By: Satish Mcdonald MD  2025  4:07 PM

## 2025-04-22 NOTE — ANESTHESIA PREPROCEDURE EVALUATION
Anesthesia Pre-Procedure Evaluation    Patient: Janet Rhodes   MRN: [id number] : 2005        Procedure : Procedure(s):  Repeat  section          Past Medical History:   Diagnosis Date    Depressive disorder       Past Surgical History:   Procedure Laterality Date     SECTION N/A 2023    Procedure:  SECTION;  Surgeon: Kamilla Barbour MD;  Location:  L+     SECTION N/A 2023    Procedure:  section;  Surgeon: Kamilla Barbour MD;  Location:  L+D      No Known Allergies   Social History     Tobacco Use    Smoking status: Never    Smokeless tobacco: Never   Substance Use Topics    Alcohol use: Not Currently      Wt Readings from Last 1 Encounters:   25 122 kg (269 lb)        Anesthesia Evaluation            ROS/MED HX  ENT/Pulmonary:       Neurologic:       Cardiovascular:       METS/Exercise Tolerance:     Hematologic:       Musculoskeletal:       GI/Hepatic:       Renal/Genitourinary:       Endo: Comment: BMI 48    (+)               Obesity,       Psychiatric/Substance Use:     (+) psychiatric history depression       Infectious Disease:       Malignancy:       Other:            Physical Exam    Airway        Mallampati: III   TM distance: > 3 FB   Neck ROM: full   Mouth opening: > 3 cm    Respiratory Devices and Support         Dental  no notable dental history     (+) Minor Abnormalities - some fillings, tiny chips      Cardiovascular          Rhythm and rate: regular     Pulmonary           breath sounds clear to auscultation           OUTSIDE LABS:  CBC:   Lab Results   Component Value Date    WBC 8.4 2025    WBC 7.8 10/11/2024    HGB 11.6 (L) 2025    HGB 12.3 2025    HCT 38.4 2025    HCT 39.7 10/11/2024     2025     2025     BMP:   Lab Results   Component Value Date     10/09/2024     2023    POTASSIUM 3.6 10/09/2024    POTASSIUM 4.3 2023    CHLORIDE 104  "10/09/2024    CHLORIDE 105 12/04/2023    CO2 22 10/09/2024    CO2 25 12/04/2023    BUN 10.7 10/09/2024    BUN 9.7 12/04/2023    CR 0.76 10/09/2024    CR 0.60 12/04/2023    GLC 83 10/09/2024     (H) 12/04/2023     COAGS: No results found for: \"PTT\", \"INR\", \"FIBR\"  POC: No results found for: \"BGM\", \"HCG\", \"HCGS\"  HEPATIC:   Lab Results   Component Value Date    ALBUMIN 4.2 10/09/2024    PROTTOTAL 7.0 10/09/2024    ALT 16 10/11/2024    AST 17 10/11/2024    ALKPHOS 56 10/09/2024    BILITOTAL 0.2 10/09/2024     OTHER:   Lab Results   Component Value Date    A1C 5.3 10/11/2024    AWAIS 9.2 10/09/2024    MAG 1.9 12/04/2023    LIPASE 27 10/09/2024       Anesthesia Plan    ASA Status:  3       Anesthesia Type: Spinal.              Consents    Anesthesia Plan(s) and associated risks, benefits, and realistic alternatives discussed. Questions answered and patient/representative(s) expressed understanding.     - Discussed:     - Discussed with:  Patient            Postoperative Care    Pain management: intrathecal morphine.   PONV prophylaxis: Ondansetron (or other 5HT-3)     Comments:    Other Comments: Fentanyl and Duramorph           Zac Pollard MD    Clinically Significant Risk Factors Present on Admission                   # Hypertension: Noted on problem list                        "

## 2025-04-22 NOTE — OP NOTE
Janet Rhodes    2005   MRN [id number]     Operative Note     Date of Operation: 2025   Surgeon: Charline Jara MD  Assistants: Lisa Alvarado MD    A surgical assistant was required due to habitus and necessary for visualization and retraction.      Pre-operative diagnosis: IUP at 39w3d   - History of prior C/S  - Obesity    Post-operative diagnosis: Same, delivered     Procedures: Repeat lower transverse  section with double layer uterine closure via Pfannenstiel incision     Anesthesia: Spinal with duramorph  IVF: 1700 mL crystalloid, 250 mL albumin  UOP: 250 mL  EBL: 420 mL    Indications: 20 year old  at 39w3d who presented for scheduled repeat  section. Pregnancy was complicated by history of C/S, hx of PreE, history of FGR with normal growth US this pregnancy, obesity, anxiety/depression, rubella nonimmune. Risks and benefits were reviewed including risk of bleeding, infection, injury to adjacent organs. The patient consented for the procedure.    Complications: None apparent    Findings: A single vigorous, liveborn male weighing 8 lbs 9 oz (3890g) with apgars of 9 and 9. Normal appearing uterus, fallopian tubes, ovaries.  No nuchal cord.  Clear amniontic fluid.  Dense subcutaneous and fascial adhesions.  Omentum adherent to the anterior abdominal wall in the midlines.    Specimen: placenta, cord blood, cord segment    Procedure Details: The patient was taken back to the operating room where she underwent spinal anesthesia. She was then prepped and draped in the usual sterile fashion in the dorsal supine position with a leftward tilt. Patient still feeling sharp sensation so spinal was redone at this time. She was then re-prepped and draped in the usual sterile fashion. A time out was performed. A pfannenstiel skin incision was made with the scalpel and carried through the underlying layer to the fascia. The fascia was incised in the midline and extended laterally  with Meadows scissors. The superior aspect of the fascial incision was grasped with Kocher clamps, elevated and the underlying rectus muscles were dissected off bluntly and with Meadows scissors. Attention was then turned to the inferior aspect of the incision, which, in a similar fashion was grasped, tented up with Kocher clamps, and the rectus muscle dissected off bluntly and with Meadows scissors. The rectus muscles were then  in the midline. The peritoneum was then identified and entered bluntly. There was noted to be omentum adherent to the anterior abdominal wall in the midline. This was carefully taken down with cautery. The peritoneum opening was extended with sharp and blunt dissection. The Darien-O was then inserted. The vesicouterine peritoneum was grasped with pick-ups and entered sharply and the incision was extended laterally with Metzenbaum scissors. Then it was grasped and a bladder flap was created digitally. The lower uterine segment was incised in a transverse fashion with the scalpel. The incision was extended digitally. The infant's head was delivered, the body rotated, and the rest of the infant was delivered atraumatically. The cord was clamped and cut and then the baby was handed off to the nursing staff. A segment of cord was taken for cord gases. The placenta was then removed with gentle traction on the cord and fundal message. The uterus was left in situ and cleared of all clots and debris. The uterine incision was repaired with 0-Vicryl in a running locked fashion. A second layer of 0-vicryl was used to imbricate the incision. There was noted to be small oozing from the right aspect of  the hysterotomy, a figure of X stitch of 0-monocryl was placed. The uterus was then noted to be hemostatic. The gutters were then irrigated and cleared of clots. The Darien-O retractor was then removed. A kocher clamp was used to elevated the fascia superiorly and inferiorly and good hemostasis was noted.   The fascia was closed with 0-vicryl in a running fashion. The subcutaneous tissue was irrigated and areas of bleeding were controlled with cautery. The subcutaneous tissue was closed with 2-0 vicryl. The skin was closed with 4-0 monocryl. The patient tolerated the procedure well and was taken to the recovery room in stable condition. All lap, instrument, and sharps counts were correct times two.      Charline Jara MD  [facility]

## 2025-04-22 NOTE — ANESTHESIA PROCEDURE NOTES
"Intrathecal Procedure Note    Pre-Procedure   Staff -        Anesthesiologist:  Zac Pollard MD       Performed By: Anesthesiologist       Location: OR       Pre-Anesthestic Checklist: patient identified, IV checked, risks and benefits discussed, informed consent, monitors and equipment checked, pre-op evaluation and at physician/surgeon's request  Timeout:       Correct Patient: Yes        Correct Procedure: Yes        Correct Site: Yes        Correct Position: Yes   Procedure Documentation  Procedure: intrathecal         Patient Position: sitting       Patient Prep/Sterile Barriers: sterile gloves, mask, patient draped       Skin prep: Chloraprep       Insertion Site: L3-4. (midline approach).       Spinal Needle Type: Xiao-Tello       Introducer used (17G Tuohy)       Introducer: 20 G       # of attempts: 2 and  # of redirects:  2    Assessment/Narrative         Paresthesias: No.       CSF fluid: clear.     Comments:  Duramorph and fentanyl dosed along with bupivacaine.    The patient was prepped and draped in a sterile fashion.  Topical anesthesia was injected subcutaneously using 1% lidocaine.  17G Tuohy needle advanced into ligamentum flavum, with SHARDA to saline at 7.5 cm at the L3-4 level.  A 24G Xiao Tello needle was advanced via a the Tuohy.  Clear CSF obtained, with birefringence on aspiration.  CSF freely aspirated after injection of 10.5 mg bupivacaine.        FOR [facility] ([facility]) ONLY:   Pain Team Contact information: please page the Pain Team Via [employer]. Search \"Pain\". During daytime hours, please page the attending first. At night please page the resident first.      "

## 2025-04-22 NOTE — PLAN OF CARE
Janet is A&Ox3, VSS, persistent sinus bradycardia but appears well perfused. Denies headache, visual changes, epigastric pain, nausea, dizziness, chest pain or SOB. 400cc clear yellow urine out in the rico, clear LS bilaterally, audible BS, tolerating clears and crackers, took PO Oxycodone for pain with good relief.     Pt transferred to 404 on the cart with infant in her arms at 1730, bedside report given to Tova Pope RN at 1735. Fundal assessment completed and security bands verified.

## 2025-04-22 NOTE — PLAN OF CARE
Pt arrived for scheduled repeat  at 1114. Prenatal record reviewed, admission assessment completed. Pre-op cares assumed.    RCS delivery of viable male infant delivered at 1422 by Dr Jara.  See delivery summary for details.

## 2025-04-23 LAB
ALT SERPL W P-5'-P-CCNC: 10 U/L (ref 0–50)
AST SERPL W P-5'-P-CCNC: 18 U/L (ref 0–45)
CREAT SERPL-MCNC: 0.48 MG/DL (ref 0.51–0.95)
EGFRCR SERPLBLD CKD-EPI 2021: >90 ML/MIN/1.73M2
HGB BLD-MCNC: 9.9 G/DL (ref 11.7–15.7)
PLATELET # BLD AUTO: 177 10E3/UL (ref 150–450)

## 2025-04-23 PROCEDURE — 250N000013 HC RX MED GY IP 250 OP 250 PS 637: Performed by: OBSTETRICS & GYNECOLOGY

## 2025-04-23 PROCEDURE — 36415 COLL VENOUS BLD VENIPUNCTURE: CPT | Performed by: OBSTETRICS & GYNECOLOGY

## 2025-04-23 PROCEDURE — 82565 ASSAY OF CREATININE: CPT | Performed by: OBSTETRICS & GYNECOLOGY

## 2025-04-23 PROCEDURE — 84460 ALANINE AMINO (ALT) (SGPT): CPT | Performed by: OBSTETRICS & GYNECOLOGY

## 2025-04-23 PROCEDURE — 90707 MMR VACCINE SC: CPT | Performed by: OBSTETRICS & GYNECOLOGY

## 2025-04-23 PROCEDURE — 90471 IMMUNIZATION ADMIN: CPT | Performed by: OBSTETRICS & GYNECOLOGY

## 2025-04-23 PROCEDURE — 85018 HEMOGLOBIN: CPT | Performed by: OBSTETRICS & GYNECOLOGY

## 2025-04-23 PROCEDURE — 84450 TRANSFERASE (AST) (SGOT): CPT | Performed by: OBSTETRICS & GYNECOLOGY

## 2025-04-23 PROCEDURE — 85049 AUTOMATED PLATELET COUNT: CPT | Performed by: OBSTETRICS & GYNECOLOGY

## 2025-04-23 PROCEDURE — 120N000013 HC R&B IMCU

## 2025-04-23 PROCEDURE — 250N000011 HC RX IP 250 OP 636: Mod: JZ | Performed by: OBSTETRICS & GYNECOLOGY

## 2025-04-23 RX ADMIN — ACETAMINOPHEN 975 MG: 325 TABLET, FILM COATED ORAL at 18:29

## 2025-04-23 RX ADMIN — MEASLES, MUMPS, AND RUBELLA VIRUS VACCINE LIVE 0.5 ML: 1000; 12500; 1000 INJECTION, POWDER, LYOPHILIZED, FOR SUSPENSION SUBCUTANEOUS at 10:47

## 2025-04-23 RX ADMIN — PRENATAL VITAMINS-IRON FUMARATE 27 MG IRON-FOLIC ACID 0.8 MG TABLET 1 TABLET: at 08:27

## 2025-04-23 RX ADMIN — SENNOSIDES AND DOCUSATE SODIUM 2 TABLET: 50; 8.6 TABLET ORAL at 21:02

## 2025-04-23 RX ADMIN — KETOROLAC TROMETHAMINE 15 MG: 15 INJECTION, SOLUTION INTRAMUSCULAR; INTRAVENOUS at 04:10

## 2025-04-23 RX ADMIN — SENNOSIDES AND DOCUSATE SODIUM 1 TABLET: 50; 8.6 TABLET ORAL at 08:27

## 2025-04-23 RX ADMIN — IBUPROFEN 800 MG: 400 TABLET, FILM COATED ORAL at 16:16

## 2025-04-23 RX ADMIN — IBUPROFEN 800 MG: 400 TABLET, FILM COATED ORAL at 22:11

## 2025-04-23 RX ADMIN — OXYCODONE HYDROCHLORIDE 5 MG: 5 TABLET ORAL at 02:28

## 2025-04-23 RX ADMIN — ACETAMINOPHEN 975 MG: 325 TABLET, FILM COATED ORAL at 06:24

## 2025-04-23 RX ADMIN — SIMETHICONE 80 MG: 80 TABLET, CHEWABLE ORAL at 04:08

## 2025-04-23 RX ADMIN — ACETAMINOPHEN 975 MG: 325 TABLET, FILM COATED ORAL at 00:18

## 2025-04-23 RX ADMIN — ACETAMINOPHEN 975 MG: 325 TABLET, FILM COATED ORAL at 12:22

## 2025-04-23 RX ADMIN — KETOROLAC TROMETHAMINE 15 MG: 15 INJECTION, SOLUTION INTRAMUSCULAR; INTRAVENOUS at 10:46

## 2025-04-23 RX ADMIN — ENOXAPARIN SODIUM 40 MG: 40 INJECTION SUBCUTANEOUS at 08:27

## 2025-04-23 RX ADMIN — ENOXAPARIN SODIUM 40 MG: 40 INJECTION SUBCUTANEOUS at 21:02

## 2025-04-23 ASSESSMENT — ACTIVITIES OF DAILY LIVING (ADL)
ADLS_ACUITY_SCORE: 24
ADLS_ACUITY_SCORE: 24
ADLS_ACUITY_SCORE: 25
ADLS_ACUITY_SCORE: 25
ADLS_ACUITY_SCORE: 24
ADLS_ACUITY_SCORE: 25
ADLS_ACUITY_SCORE: 25
ADLS_ACUITY_SCORE: 24
ADLS_ACUITY_SCORE: 24
ADLS_ACUITY_SCORE: 25
ADLS_ACUITY_SCORE: 24
ADLS_ACUITY_SCORE: 25
ADLS_ACUITY_SCORE: 24
ADLS_ACUITY_SCORE: 24
ADLS_ACUITY_SCORE: 25
ADLS_ACUITY_SCORE: 25
ADLS_ACUITY_SCORE: 24
ADLS_ACUITY_SCORE: 25
ADLS_ACUITY_SCORE: 24

## 2025-04-23 NOTE — PLAN OF CARE
Goal Outcome Evaluation:       Patient's vital signs are stable.  She is tolerating diet, ambulating and caring for the baby independently.  She has been feeding the infant formula during this shift but says she wants to pump and breastfeed.  She did breastfeed infant over night.  She has been able to void adequately.  Fundus firm and midline.  Adequate pain control with oral pain medications.  Incision is clean, dry and intact.  Lochia is WNL.

## 2025-04-23 NOTE — LACTATION NOTE
"Lactation visit with Janet, her family, and baby boy.    Infant was laying on his back in the bed and Janet was offering him formula in a bottle. Infant not swallowing well and appears to be choking on his milk. Adjusted infants position to side-lying for a safer bottling experience. Janet shares she has been trying to breastfeed but baby has been sleepy at the breast because she has \"no milk\". Educated to our first milk and normal infant feeding behaviors in the early days, sleepiness in day 1 which can be followed by a more alert and actively feeding baby on day 2-3.      Suggested that if Janet's plan is to breastfeed:  Discussed  breastfeeding basics:   1. Watch for early feeding cues (licking lips, stirring or rooting, sucking movement with mouth, hands to mouth).  2. Infant should breastfeed on demand and a minimum of 8 times in 24 hours. Encourage/offer to breastfeed infant at least 3 hours (from the start of the last feeding).   3. Offer infant both breasts with each feeding, \"dinner and dessert\"!    Educated on techniques to wake a sleepy baby for feedings, showing parents where this information can be found in the [place] Handbook.    Janet has her home Zomee pump she wanted to practice with. She wanted help with this pump, but I'm not familiar with this pump, so encouraged she go to Zomee's website OR find an LC on YouTube providing instruction for this pump. However, offered our hospital grade Medela while she's in the hospital since I could help with this pump. At this time Janet wants to practice  with her Zomee. Also educated to infant's stomach size and what he can tolerate for volumes. Offered to assist with a breastfeeding session later if needed.    Stephenie Zapata RN, IBCLC          "

## 2025-04-23 NOTE — PROVIDER NOTIFICATION
04/23/25 0300   Provider Notification   Provider Name/Title Dr. Liu   Method of Notification Electronic Page   Request Evaluate-Remote   Notification Reason Status Update     FYI pt had 2nd mid range BP in 24 hours 149/78 about 15 minutes after getting up for the first time    HR hovers around 60 but down to 45bpm    Response/Plan: continue to monitor no new orders

## 2025-04-23 NOTE — PLAN OF CARE
Goal Outcome Evaluation:      Plan of Care Reviewed With: patient, significant other    Overall Patient Progress: improvingOverall Patient Progress: improving     [id number]  Vitals: 1mid range BP overnight (2nd in 24hr) low HR averaging around 60 but down to 45 - provider aware  Pain: managed with tylenol, toradol, oxy  Fundus: firm, midline   Lochia: scant   Incision: WDL CDI  GI/: WDL, 1 adequate void  passing gas, no BM

## 2025-04-23 NOTE — PLAN OF CARE
VSS. Pain controlled with tylenol and toradol. Up independently and voiding. Dressing removed, incision with steri strips. Scant lochia.

## 2025-04-23 NOTE — PROGRESS NOTES
"Post-partum Note      S: Patient is doing well this morning.  Pain is controlled.  Tolerating regular diet without nausea or vomiting.  Ambulating without dizziness.  Urinating without difficulty. Lochia normal.  Did have an elevated blood pressure last night at 2342, but normal since that time. Denies HA, vision changes, RUQ pain.      O:   Patient Vitals for the past 24 hrs:   BP Temp Temp src Pulse Resp SpO2 Height Weight   04/23/25 0809 105/51 98  F (36.7  C) Oral 66 16 -- -- --   04/23/25 0350 135/67 98.1  F (36.7  C) Oral 58 16 98 % -- --   04/23/25 0227 -- -- -- -- -- 98 % -- --   04/22/25 2342 (!) 149/78 97.5  F (36.4  C) Oral 60 18 97 % -- --   04/22/25 2217 126/69 97.9  F (36.6  C) Oral 67 16 97 % -- --   04/22/25 2112 111/64 -- -- 65 16 97 % -- --   04/22/25 2103 -- -- -- -- 16 -- -- --   04/22/25 2013 121/66 98.1  F (36.7  C) Oral 70 16 97 % -- --   04/22/25 1914 137/76 98.5  F (36.9  C) Oral 82 16 98 % -- --   04/22/25 1815 131/80 -- -- 72 16 98 % -- --   04/22/25 1743 127/77 98.4  F (36.9  C) Oral 68 18 100 % -- --   04/22/25 1715 116/75 -- -- (!) 49 15 100 % -- --   04/22/25 1700 122/76 97.1  F (36.2  C) Temporal 59 20 100 % -- --   04/22/25 1645 123/79 -- -- 55 18 100 % -- --   04/22/25 1639 -- -- -- (!) 47 22 99 % -- --   04/22/25 1630 129/62 -- -- 57 12 99 % -- --   04/22/25 1615 128/63 -- -- (!) 47 13 99 % -- --   04/22/25 1600 (!) 109/94 -- -- 50 16 100 % -- --   04/22/25 1545 123/62 -- -- 51 10 99 % -- --   04/22/25 1154 -- -- -- -- -- -- 1.6 m (5' 3\") 122 kg (269 lb)   04/22/25 1135 122/75 97.9  F (36.6  C) Temporal -- 16 -- -- --     Gen:  Resting comfortably, NAD  Pulm:  Breathing comfortably on room air  Abd:  Soft, appropriately ttp, non-distended.Fundus below umbilicus, firm and non-tender.  Incision: c/d/I with bandage overlying  Ext:  non-tender, trace bilateral LE edema    I/O last 3 completed shifts:  In: 3569 [P.O.:720; I.V.:2599]  Out: 2053 [Urine:1633; " Blood:420]    Hgb: Pending    Assessment/Plan:  20 year old  on POD #1 s/p RLTCS. Pregnancy otherwise complicated by obesity, anxiety and depression, history of preeclampsia with first pregnancy, history of FGR with first pregnancy, rubella non-immune.  1. Continue with routine postpartum management  2. Elevated BP- monitor closely. Not yet meeting criteria for GHTN or pre-e. Check pre-e labs this morning.  3. Depression/anxiety: not currently on meds, will need mood check  4. Hgb: Pending.  5. Rubella non-immune: MMR ordered   6. Rh: Positive  7. VTE ppx: lovenox while in house, SCDs, encourage ambulation    Dispo: DC home POD#3. Warning signs reviewed.    Stormy Mcfarlane MD  [facility]  2025, 8:37 AM

## 2025-04-23 NOTE — PROVIDER NOTIFICATION
04/23/25 0300   Provider Notification   Provider Name/Title Dr. Liu   Method of Notification Electronic Page   Request Evaluate-Remote   Notification Reason Status Update     FYI pt had 2nd mid range BP in 24 hours 149/78 about 15 minutes after getting up for the first time and HR down to 45 at the lowest

## 2025-04-23 NOTE — PROVIDER NOTIFICATION
04/22/25 1947   Provider Notification   Provider Name/Title Dr. Leeanna Liu   Method of Notification Electronic Page   Request Evaluate-Remote   Notification Reason Medication Request;Other  (pt requesting pain medication)     Paged Dr. Liu regarding pain medication regimen. Per Dr. Liu ok to given 10mg now, but try to decrease back to 5mg every 4 hours once toradol is started.

## 2025-04-24 VITALS
TEMPERATURE: 97.9 F | RESPIRATION RATE: 16 BRPM | HEIGHT: 63 IN | HEART RATE: 71 BPM | OXYGEN SATURATION: 98 % | DIASTOLIC BLOOD PRESSURE: 59 MMHG | SYSTOLIC BLOOD PRESSURE: 132 MMHG | WEIGHT: 275.4 LBS | BODY MASS INDEX: 48.8 KG/M2

## 2025-04-24 PROCEDURE — 250N000011 HC RX IP 250 OP 636: Performed by: OBSTETRICS & GYNECOLOGY

## 2025-04-24 PROCEDURE — 250N000013 HC RX MED GY IP 250 OP 250 PS 637: Performed by: OBSTETRICS & GYNECOLOGY

## 2025-04-24 RX ORDER — ACETAMINOPHEN 325 MG/1
975 TABLET ORAL EVERY 6 HOURS
COMMUNITY
Start: 2025-04-24

## 2025-04-24 RX ORDER — OXYCODONE HYDROCHLORIDE 5 MG/1
5-10 TABLET ORAL EVERY 4 HOURS PRN
Qty: 5 TABLET | Refills: 0 | Status: SHIPPED | OUTPATIENT
Start: 2025-04-24

## 2025-04-24 RX ORDER — IBUPROFEN 800 MG/1
800 TABLET, FILM COATED ORAL EVERY 6 HOURS
COMMUNITY
Start: 2025-04-24

## 2025-04-24 RX ORDER — AMOXICILLIN 250 MG
1 CAPSULE ORAL 2 TIMES DAILY
COMMUNITY
Start: 2025-04-24

## 2025-04-24 RX ADMIN — PRENATAL VITAMINS-IRON FUMARATE 27 MG IRON-FOLIC ACID 0.8 MG TABLET 1 TABLET: at 08:03

## 2025-04-24 RX ADMIN — ACETAMINOPHEN 975 MG: 325 TABLET, FILM COATED ORAL at 00:37

## 2025-04-24 RX ADMIN — SENNOSIDES AND DOCUSATE SODIUM 1 TABLET: 50; 8.6 TABLET ORAL at 08:02

## 2025-04-24 RX ADMIN — IBUPROFEN 800 MG: 400 TABLET, FILM COATED ORAL at 04:04

## 2025-04-24 RX ADMIN — IBUPROFEN 800 MG: 400 TABLET, FILM COATED ORAL at 11:30

## 2025-04-24 RX ADMIN — ACETAMINOPHEN 975 MG: 325 TABLET, FILM COATED ORAL at 07:12

## 2025-04-24 RX ADMIN — ENOXAPARIN SODIUM 40 MG: 40 INJECTION SUBCUTANEOUS at 08:03

## 2025-04-24 ASSESSMENT — ACTIVITIES OF DAILY LIVING (ADL)
ADLS_ACUITY_SCORE: 24

## 2025-04-24 NOTE — PLAN OF CARE
Goal Outcome Evaluation:      Plan of Care Reviewed With: patient, significant other    Overall Patient Progress: improvingOverall Patient Progress: improving     [id number]  Vitals: VSS   Pain: managed with tylenol, ibuprofen,   Fundus: firm, midline   Lochia: scant   Incision: WDL CDI  GI/: WDL, adequate voids,   passing gas, no BM  Plan: discharge today

## 2025-04-24 NOTE — PROGRESS NOTES
Post-partum Note      S: Patient is doing well today.  Pain is controlled with PO medications.  Tolerating regular diet without nausea or vomiting.  Ambulating without dizziness.  Urinating without difficulty. Lochia normal.  Breastfeeding. Denies HA, vision changes, RUQ pain.     O:  Patient Vitals for the past 24 hrs:   BP Temp Temp src Pulse Resp   25 0104 136/67 98.5  F (36.9  C) Oral 62 18   25 2105 127/63 97.6  F (36.4  C) Oral 65 16   25 1520 116/62 98.2  F (36.8  C) Oral 70 16   25 1300 123/69 -- -- 75 16       Gen:  Resting comfortably, NAD  Pulm:  Breathing comfortably on room air  Abd:  Soft, appropriately ttp, non-distended.Fundus at umbilicus, firm and non-tender.  Incision: C/D/I with steri strips in place  Ext:  non-tender, trace bilateral LE edema    I/O last 3 completed shifts:  In: -   Out: 200 [Urine:200]    Hgb: 9.9    Hemoglobin   Date Value Ref Range Status   2025 9.9 (L) 11.7 - 15.7 g/dL Final   2025   Corrected     Comment:     Incorrect patient, disregard results.  This is a corrected result. Previous result was 10.2 g/dL on 2025 at  4:44 PM CDT       Assessment/Plan:  20 year old  on POD #2 s/p RLTCS.     Pregnancy complications:  obesity, anxiety and depression, history of preeclampsia with first pregnancy, history of FGR with first pregnancy, rubella non-immune.     1. Continue with routine postpartum management  2. Pain well controlled, Incision is c/d/I with steri strips in place    3. EBL: 420 mL ; hemoglobin 9.9. no symptoms of anemia. Assume acute blood loss anemia. On PO iron  4. O POS, Rubella non immune - MMR before discharge   5. Feed: Breastfeeding  6. CV/RESP: VSS, not yet meeting criteria for GHTN or pre-e. Check pre-e labs this morning.  7. VTE ppx: lovenox while in house, SCDs, encourage ambulation   8. Depression/anxiety: not currently on meds, will need mood check    Dispo: Anticipate DC home todY. Warning signs reviewed.  Follow-up in 2 & 6 weeks.    FREIDA Escobedo, [facility]  4/24/2025, 9:11 AM

## 2025-04-24 NOTE — DISCHARGE INSTRUCTIONS
Warning Signs after Having a Baby    Keep this paper on your fridge or somewhere else where you can see it.    Call your provider if you have any of these symptoms up to 12 weeks after having your baby.    Thoughts of hurting yourself or your baby  Pain in your chest or trouble breathing  Severe headache not helped by pain medicine  Eyesight concerns (blurry vision, seeing spots or flashes of light, other changes to eyesight)  Fainting, shaking or other signs of a seizure    Call 9-1-1 if you feel that it is an emergency.     The symptoms below can happen to anyone after giving birth. They can be very serious. Call your provider if you have any of these warning signs.    My provider s phone number: _______________________    Losing too much blood (hemorrhage)    Call your provider if you soak through a pad in less than an hour or pass blood clots bigger than a golf ball. These may be signs that you are bleeding too much.    Blood clots in the legs or lungs    After you give birth, your body naturally clots its blood to help prevent blood loss. Sometimes this increased clotting can happen in other areas of the body, like the legs or lungs. This can block your blood flow and be very dangerous.     Call your provider if you:  Have a red, swollen spot on the back of your leg that is warm or painful when you touch it.   Are coughing up blood.     Infection    Call your provider if you have any of these symptoms:  Fever of 100.4 F (38 C) or higher.  Pain or redness around your stitches if you had an incision.   Any yellow, white, or green fluid coming from places where you had stitches or surgery.    Mood Problems (postpartum depression)    Many people feel sad or have mood changes after having a baby. But for some people, these mood swings are worse.     Call your provider right away if you feel so anxious or nervous that you can't care for yourself or your baby.    Preeclampsia (high blood pressure)    Even if you  didn't have high blood pressure when you were pregnant, you are at risk for the high blood pressure disease called preeclampsia. This risk can last up to 12 weeks after giving birth.     Call your provider if you have:   Pain on your right side under your rib cage  Sudden swelling in the hands and face    Remember: You know your body. If something doesn't feel right, get medical help.     For informational purposes only. Not to replace the advice of your health care provider. Copyright 2020 [facility]. All rights reserved. Clinically reviewed by Latia Rodriguez, RNC-OB, MSN. [employer] [facility] 640641 - Rev .     Section: What to Expect at Home  Your Recovery     A  section, or , is surgery to deliver your baby through a cut that the doctor makes in your lower belly and uterus. The cut is called an incision.  You may have some pain in your lower belly and need pain medicine for 1 to 2 weeks. You can expect some vaginal bleeding for several weeks. You will probably need about 6 weeks to fully recover.  It's important to take it easy while the incision heals. Avoid heavy lifting, strenuous activities, and exercises that strain the belly muscles while you recover. Ask a family member or friend for help with housework, cooking, and shopping.  This care sheet gives you a general idea about how long it will take for you to recover. But each person recovers at a different pace. Follow the steps below to get better as quickly as possible.  How can you care for yourself at home?  Activity       Rest when you feel tired. Getting enough sleep will help you recover.        Try to walk each day. Start by walking a little more than you did the day before. Bit by bit, increase the amount you walk. Walking boosts blood flow and helps prevent pneumonia, constipation, and blood clots.        Avoid strenuous activities, such as bicycle riding, jogging, weightlifting, and aerobic exercise, for 6 weeks  or until your doctor says it is okay.        Until your doctor says it is okay, do not lift anything heavier than your baby.        Do not do sit-ups or other exercises that strain the belly muscles for 6 weeks or until your doctor says it is okay.        Hold a pillow over your incision when you cough or take deep breaths. This will support your belly and decrease your pain.        You may shower as usual. Pat the incision dry when you are done.        You will have some vaginal bleeding. Wear sanitary pads. Do not douche or use tampons until your doctor says it is okay.        Ask your doctor when you can drive again.        You will probably need to take at least 6 weeks off work. It depends on the type of work you do and how you feel.        Ask your doctor when it is okay for you to have sex.   Diet       You can eat your normal diet. If your stomach is upset, try bland, low-fat foods like plain rice, broiled chicken, toast, and yogurt.        Drink plenty of fluids (unless your doctor tells you not to).        You may notice that your bowel movements are not regular right after your surgery. This is common. Try to avoid constipation and straining with bowel movements. You may want to take a fiber supplement every day. If you have not had a bowel movement after a couple of days, ask your doctor about taking a mild laxative.        If you are breastfeeding, limit alcohol. Alcohol can cause a lack of energy and other health problems for the baby when a breastfeeding woman drinks heavily. It can also get in the way of a mom's ability to feed her baby or to care for the child in other ways. There isn't a lot of research about exactly how much alcohol can harm a baby. Having no alcohol is the safest choice for your baby. If you choose to have a drink now and then, have only one drink, and limit the number of occasions that you have a drink. Wait to breastfeed at least 2 hours after you have a drink to reduce the  amount of alcohol the baby may get in the milk.   Medicines       Your doctor will tell you if and when you can restart your medicines. You will also get instructions about taking any new medicines.        If you stopped taking aspirin or some other blood thinner, your doctor will tell you when to start taking it again.        Take pain medicines exactly as directed.  If the doctor gave you a prescription medicine for pain, take it as prescribed.  If you are not taking a prescription pain medicine, ask your doctor if you can take an over-the-counter medicine.        If you think your pain medicine is making you sick to your stomach:  Take your medicine after meals (unless your doctor has told you not to).  Ask your doctor for a different pain medicine.        If your doctor prescribed antibiotics, take them as directed. Do not stop taking them just because you feel better. You need to take the full course of antibiotics.   Incision care       If you have strips of tape on the incision, leave the tape on for a week or until it falls off.        Wash the area daily with warm, soapy water, and pat it dry. Don't use hydrogen peroxide or alcohol, which can slow healing. You may cover the area with a gauze bandage if it weeps or rubs against clothing. Change the bandage every day.        Keep the area clean and dry.   Other instructions       If you breastfeed your baby, you may be more comfortable while you are healing if you don't rest your baby on your belly. Try tucking your baby under your arm, with your baby's body along the side you will be feeding on. Support your baby's upper body with your arm. With that hand you can control your baby's head to bring your baby's mouth to your breast. This is sometimes called the football hold.   Follow-up care is a key part of your treatment and safety. Be sure to make and go to all appointments, and call your doctor if you are having problems. It's also a good idea to know your  test results and keep a list of the medicines you take.  When should you call for help?  Share this information with your partner, family, or a friend. They can help you watch for warning signs.  Call 911  anytime you think you may need emergency care. For example, call if:       You feel you cannot stop from hurting yourself, your baby, or someone else.        You passed out (lost consciousness).        You have chest pain, are short of breath, or cough up blood.        You have a seizure.   Where to get help 24 hours a day, 7 days a week   If you or someone you know talks about suicide, self-harm, a mental health crisis, a substance use crisis, or any other kind of emotional distress, get help right away. You can:       Call the Suicide and Crisis Lifeline at 988.        Call 8-297-830-TALK (1-247.824.9225).        Text HOME to 231392 to access the Crisis Text Line.   Consider saving these numbers in your phone.  Go to [employer] for more information or to chat online.  Call your doctor or midwife now or seek immediate medical care if:       You have loose stitches, or your incision comes open.        You have signs of hemorrhage (too much bleeding), such as:  Heavy vaginal bleeding. This means that you are soaking through one or more pads in an hour. Or you pass blood clots bigger than an egg.  Feeling dizzy or lightheaded, or you feel like you may faint.  Feeling so tired or weak that you cannot do your usual activities.  A fast or irregular heartbeat.  New or worse belly pain.        You have symptoms of infection, such as:  Increased pain, swelling, warmth, or redness.  Red streaks leading from the incision.  Pus draining from the incision.  A fever.  Frequent or painful urination or blood in your urine.  Vaginal discharge that smells bad.  New or worse belly pain.        You have symptoms of a blood clot in your leg (called a deep vein thrombosis), such as:  Pain in the calf, back of the knee, thigh, or  "groin.  Swelling in the leg or groin.  A color change on the leg or groin. The skin may be reddish or purplish, depending on your usual skin color.        You have signs of preeclampsia, such as:  Sudden swelling of your face, hands, or feet.  New vision problems (such as dimness, blurring, or seeing spots).  A severe headache.        You have signs of heart failure, such as:  New or increased shortness of breath.  New or worse swelling in your legs, ankles, or feet.  Sudden weight gain, such as more than 2 to 3 pounds in a day or 5 pounds in a week.  Feeling so tired or weak that you cannot do your usual activities.        You had spinal or epidural pain relief and have:  New or worse back pain.  Increased pain, swelling, warmth, or redness at the injection site.  Tingling, weakness, or numbness in your legs or groin.   Watch closely for changes in your health, and be sure to contact your doctor or midwife if:       Your vaginal bleeding isn't decreasing.        You feel sad, anxious, or hopeless for more than a few days.        You are having problems with your breasts or breastfeeding.   Where can you learn more?  Go to https://www.[facility].net/patiented  Enter M806 in the search box to learn more about \" Section: What to Expect at Home.\"  Current as of: 2024  Content Version: 14.4    7947-8003 [employer].   Care instructions adapted under license by your healthcare professional. If you have questions about a medical condition or this instruction, always ask your healthcare professional. [employer] disclaims any warranty or liability for your use of this information.     Section: What to Expect at Home  Your Recovery     A  section, or , is surgery to deliver your baby through a cut that the doctor makes in your lower belly and uterus. The cut is called an incision.  You may have some pain in your lower belly and need pain medicine for 1 to 2 " weeks. You can expect some vaginal bleeding for several weeks. You will probably need about 6 weeks to fully recover.  It's important to take it easy while the incision heals. Avoid heavy lifting, strenuous activities, and exercises that strain the belly muscles while you recover. Ask a family member or friend for help with housework, cooking, and shopping.  This care sheet gives you a general idea about how long it will take for you to recover. But each person recovers at a different pace. Follow the steps below to get better as quickly as possible.  How can you care for yourself at home?  Activity       Rest when you feel tired. Getting enough sleep will help you recover.        Try to walk each day. Start by walking a little more than you did the day before. Bit by bit, increase the amount you walk. Walking boosts blood flow and helps prevent pneumonia, constipation, and blood clots.        Avoid strenuous activities, such as bicycle riding, jogging, weightlifting, and aerobic exercise, for 6 weeks or until your doctor says it is okay.        Until your doctor says it is okay, do not lift anything heavier than your baby.        Do not do sit-ups or other exercises that strain the belly muscles for 6 weeks or until your doctor says it is okay.        Hold a pillow over your incision when you cough or take deep breaths. This will support your belly and decrease your pain.        You may shower as usual. Pat the incision dry when you are done.        You will have some vaginal bleeding. Wear sanitary pads. Do not douche or use tampons until your doctor says it is okay.        Ask your doctor when you can drive again.        You will probably need to take at least 6 weeks off work. It depends on the type of work you do and how you feel.        Ask your doctor when it is okay for you to have sex.   Diet       You can eat your normal diet. If your stomach is upset, try bland, low-fat foods like plain rice, broiled  chicken, toast, and yogurt.        Drink plenty of fluids (unless your doctor tells you not to).        You may notice that your bowel movements are not regular right after your surgery. This is common. Try to avoid constipation and straining with bowel movements. You may want to take a fiber supplement every day. If you have not had a bowel movement after a couple of days, ask your doctor about taking a mild laxative.        If you are breastfeeding, limit alcohol. Alcohol can cause a lack of energy and other health problems for the baby when a breastfeeding woman drinks heavily. It can also get in the way of a mom's ability to feed her baby or to care for the child in other ways. There isn't a lot of research about exactly how much alcohol can harm a baby. Having no alcohol is the safest choice for your baby. If you choose to have a drink now and then, have only one drink, and limit the number of occasions that you have a drink. Wait to breastfeed at least 2 hours after you have a drink to reduce the amount of alcohol the baby may get in the milk.   Medicines       Your doctor will tell you if and when you can restart your medicines. You will also get instructions about taking any new medicines.        If you stopped taking aspirin or some other blood thinner, your doctor will tell you when to start taking it again.        Take pain medicines exactly as directed.  If the doctor gave you a prescription medicine for pain, take it as prescribed.  If you are not taking a prescription pain medicine, ask your doctor if you can take an over-the-counter medicine.        If you think your pain medicine is making you sick to your stomach:  Take your medicine after meals (unless your doctor has told you not to).  Ask your doctor for a different pain medicine.        If your doctor prescribed antibiotics, take them as directed. Do not stop taking them just because you feel better. You need to take the full course of  antibiotics.   Incision care       If you have strips of tape on the incision, leave the tape on for a week or until it falls off.        Wash the area daily with warm, soapy water, and pat it dry. Don't use hydrogen peroxide or alcohol, which can slow healing. You may cover the area with a gauze bandage if it weeps or rubs against clothing. Change the bandage every day.        Keep the area clean and dry.   Other instructions       If you breastfeed your baby, you may be more comfortable while you are healing if you don't rest your baby on your belly. Try tucking your baby under your arm, with your baby's body along the side you will be feeding on. Support your baby's upper body with your arm. With that hand you can control your baby's head to bring your baby's mouth to your breast. This is sometimes called the Spazzles hold.   Follow-up care is a key part of your treatment and safety. Be sure to make and go to all appointments, and call your doctor if you are having problems. It's also a good idea to know your test results and keep a list of the medicines you take.  When should you call for help?  Share this information with your partner, family, or a friend. They can help you watch for warning signs.  Call 161  anytime you think you may need emergency care. For example, call if:       You feel you cannot stop from hurting yourself, your baby, or someone else.        You passed out (lost consciousness).        You have chest pain, are short of breath, or cough up blood.        You have a seizure.   Where to get help 24 hours a day, 7 days a week   If you or someone you know talks about suicide, self-harm, a mental health crisis, a substance use crisis, or any other kind of emotional distress, get help right away. You can:       Call the Suicide and Crisis Lifeline at 924.        Call 2-293-141-TALK (1-776.731.5350).        Text HOME to 120437 to access the Crisis Text Line.   Consider saving these numbers in your  phone.  Go to [employer] for more information or to chat online.  Call your doctor or midwife now or seek immediate medical care if:       You have loose stitches, or your incision comes open.        You have signs of hemorrhage (too much bleeding), such as:  Heavy vaginal bleeding. This means that you are soaking through one or more pads in an hour. Or you pass blood clots bigger than an egg.  Feeling dizzy or lightheaded, or you feel like you may faint.  Feeling so tired or weak that you cannot do your usual activities.  A fast or irregular heartbeat.  New or worse belly pain.        You have symptoms of infection, such as:  Increased pain, swelling, warmth, or redness.  Red streaks leading from the incision.  Pus draining from the incision.  A fever.  Frequent or painful urination or blood in your urine.  Vaginal discharge that smells bad.  New or worse belly pain.        You have symptoms of a blood clot in your leg (called a deep vein thrombosis), such as:  Pain in the calf, back of the knee, thigh, or groin.  Swelling in the leg or groin.  A color change on the leg or groin. The skin may be reddish or purplish, depending on your usual skin color.        You have signs of preeclampsia, such as:  Sudden swelling of your face, hands, or feet.  New vision problems (such as dimness, blurring, or seeing spots).  A severe headache.        You have signs of heart failure, such as:  New or increased shortness of breath.  New or worse swelling in your legs, ankles, or feet.  Sudden weight gain, such as more than 2 to 3 pounds in a day or 5 pounds in a week.  Feeling so tired or weak that you cannot do your usual activities.        You had spinal or epidural pain relief and have:  New or worse back pain.  Increased pain, swelling, warmth, or redness at the injection site.  Tingling, weakness, or numbness in your legs or groin.   Watch closely for changes in your health, and be sure to contact your doctor or midwife  "if:       Your vaginal bleeding isn't decreasing.        You feel sad, anxious, or hopeless for more than a few days.        You are having problems with your breasts or breastfeeding.   Where can you learn more?  Go to https://www."[employer]net/patiented  Enter M806 in the search box to learn more about \" Section: What to Expect at Home.\"  Current as of: 2024  Content Version: 14.4    4866-4508 [employer].   Care instructions adapted under license by your healthcare professional. If you have questions about a medical condition or this instruction, always ask your healthcare professional. [employer] disclaims any warranty or liability for your use of this information.      "

## 2025-04-29 NOTE — DISCHARGE SUMMARY
OB  Discharge Summary    DOA: 2025  DOD:  2025    Admission Diagnosis  1.  IUP @ 39w3d   2.  History of prior C/S  3. Obesity    Discharge Diagnosis  Same s/p RLTCS      HPI / Hospital Course:     Patient is a 20 year old  39w3d who presented to L&D for scheduled repeat  section.    Patient's prenatal course has been complicated by:  - History of prior C/S  - Obesity  - Anxiety and depression on medications  - Rubella immune  - History of preE  - History of FGR, EFW 31% at 36 wks    Intra-operative course:  Patient was taken to the operating room for the above noted procedure. No complications.  For full details of intraoperative course, please see dictated operative note.      Post-operative course:   Patient's post-operative course was uncomplicated.  By post-operative day #2 she was ambulating, her pain was well-controlled on oral pain medications, her bleeding was minimal, she was tolerating PO, she was voiding and passing flatus.  Patient was deemed stable for discharge.  Her post-op Hgb was   Lab Results   Component Value Date    HGB 9.9 2025    HGB  2025      Comment:      Incorrect patient, disregard results.  This is a corrected result. Previous result was 10.2 g/dL on 2025 at  4:44 PM CDT   .  Her vital signs remained stable  On the day of discharge, her wound was without evidence of infection.      Post-op instructions:  1.  Patient was instructed to RTC in 6 weeks for post-partum visit.  2.  Patient was instructed to call MD for temperature greater than 100.4, foul smelling vaginal discharge, bleeding >1pad per hour, severe pain not controlled by pain medications, severe HA, redness/drainage from incision, asymmetric LE edema or with other concerns.  3.  Post-partum mood symptoms discussed.  Pt will call with si/sx of depression.  4.  Patient instructed to avoid lifting >20# x 6 weeks, to avoid vigorous exercise x 6 weeks, to observe pelvic rest x 6 weeks  (no tampons or IC) and to avoid driving while taking narcotics.  5.  Patient instructed to remove steri-strips after 1 week.       Post-op medications:  1.  Ibuprofen 600mg PO Q6hrs PRN pain  2.  Oxycodone 5mg PO Q4hrs PRN severe pain  3.  Colace or other stool softener as needed  4.  PNV  5.  Resume home medications    Charline Jara MD  4/29/2025  3:41 PM

## 2025-07-11 NOTE — PROVIDER NOTIFICATION
10/25/23 1445   Comments   Comments To radiology per wheel chair for BPP       
(1) Oriented to own ability

## (undated) DEVICE — PREP CHLORAPREP 26ML TINTED HI-LITE ORANGE 930815

## (undated) DEVICE — LINEN TOWEL PACK X10 5473

## (undated) DEVICE — LINEN TOWEL PACK X5 5464

## (undated) DEVICE — DRAPE SHEET REV FOLD 3/4 9349

## (undated) DEVICE — SU VICRYL 0 CT-1 36" J346H

## (undated) DEVICE — PACK C-SECTION LF PL15OTA83B

## (undated) DEVICE — STPL SKIN PROXIMATE 35 WIDE PMW35

## (undated) DEVICE — ESU GROUND PAD UNIVERSAL W/O CORD

## (undated) DEVICE — SOL WATER IRRIG 1000ML BOTTLE 2F7114

## (undated) DEVICE — DRSG KERLIX FLUFFS X5

## (undated) DEVICE — SOL NACL 0.9% IRRIG 1000ML BOTTLE 2F7124

## (undated) DEVICE — RETR PANNICULUS TRAXI FOR PT POSITIONING PRS-1030

## (undated) DEVICE — SOL NACL 0.9% IRRIG 1000ML BOTTLE 07138-09

## (undated) DEVICE — TRANSFER DEVICE BLOOD NDL HOLDER 364880

## (undated) DEVICE — ESU GROUND PAD ADULT W/CORD E7507

## (undated) DEVICE — SU MONOCRYL 4-0 PS-2 18" UND Y496G

## (undated) DEVICE — GLOVE BIOGEL PI MICRO SZ 6.0 48560

## (undated) DEVICE — LINEN BABY BLANKET 5434

## (undated) DEVICE — SU MONOCRYL 0 CT 36" Y358H

## (undated) DEVICE — PREP CHLORAPREP 26ML TINTED ORANGE  260815

## (undated) DEVICE — PAD CHUX UNDERPAD 23X24" 7136

## (undated) DEVICE — BAG DECANTER STERILE WHITE DYNJDEC09

## (undated) DEVICE — GLOVE BIOGEL PI MICRO INDICATOR UNDERGLOVE SZ 6.5 48965

## (undated) DEVICE — BLADE CLIPPER SGL USE 9680

## (undated) DEVICE — LIGHT HANDLE X2

## (undated) DEVICE — SUCTION CANISTER MEDIVAC LINER 3000ML W/LID 65651-530

## (undated) DEVICE — SOL WATER IRRIG 1000ML BOTTLE 07139-09

## (undated) DEVICE — BLADE CLIPPER 4406

## (undated) DEVICE — CATH TRAY FOLEY SURESTEP 16FR DRAIN BAG STATOCK A899916

## (undated) DEVICE — PACK SET-UP STD 9102

## (undated) RX ORDER — FENTANYL CITRATE 50 UG/ML
INJECTION, SOLUTION INTRAMUSCULAR; INTRAVENOUS
Status: DISPENSED
Start: 2023-12-02

## (undated) RX ORDER — TRANEXAMIC ACID 10 MG/ML
INJECTION, SOLUTION INTRAVENOUS
Status: DISPENSED
Start: 2023-12-02

## (undated) RX ORDER — ONDANSETRON 2 MG/ML
INJECTION INTRAMUSCULAR; INTRAVENOUS
Status: DISPENSED
Start: 2025-04-22

## (undated) RX ORDER — MORPHINE SULFATE 1 MG/ML
INJECTION, SOLUTION EPIDURAL; INTRATHECAL; INTRAVENOUS
Status: DISPENSED
Start: 2025-04-22

## (undated) RX ORDER — MORPHINE SULFATE 1 MG/ML
INJECTION, SOLUTION EPIDURAL; INTRATHECAL; INTRAVENOUS
Status: DISPENSED
Start: 2023-12-02

## (undated) RX ORDER — KETOROLAC TROMETHAMINE 30 MG/ML
INJECTION, SOLUTION INTRAMUSCULAR; INTRAVENOUS
Status: DISPENSED
Start: 2025-04-22

## (undated) RX ORDER — EPHEDRINE SULFATE 50 MG/ML
INJECTION, SOLUTION INTRAMUSCULAR; INTRAVENOUS; SUBCUTANEOUS
Status: DISPENSED
Start: 2025-04-22

## (undated) RX ORDER — FENTANYL CITRATE 50 UG/ML
INJECTION, SOLUTION INTRAMUSCULAR; INTRAVENOUS
Status: DISPENSED
Start: 2025-04-22

## (undated) RX ORDER — KETOROLAC TROMETHAMINE 30 MG/ML
INJECTION, SOLUTION INTRAMUSCULAR; INTRAVENOUS
Status: DISPENSED
Start: 2023-12-02

## (undated) RX ORDER — OXYTOCIN/0.9 % SODIUM CHLORIDE 30/500 ML
PLASTIC BAG, INJECTION (ML) INTRAVENOUS
Status: DISPENSED
Start: 2023-12-02